# Patient Record
Sex: FEMALE | Race: WHITE | HISPANIC OR LATINO | Employment: FULL TIME | URBAN - METROPOLITAN AREA
[De-identification: names, ages, dates, MRNs, and addresses within clinical notes are randomized per-mention and may not be internally consistent; named-entity substitution may affect disease eponyms.]

---

## 2021-08-14 ENCOUNTER — LAB VISIT (OUTPATIENT)
Dept: PRIMARY CARE CLINIC | Facility: OTHER | Age: 21
End: 2021-08-14
Payer: OTHER GOVERNMENT

## 2021-08-14 DIAGNOSIS — U07.1 COVID-19: Primary | ICD-10-CM

## 2021-08-14 PROCEDURE — U0003 INFECTIOUS AGENT DETECTION BY NUCLEIC ACID (DNA OR RNA); SEVERE ACUTE RESPIRATORY SYNDROME CORONAVIRUS 2 (SARS-COV-2) (CORONAVIRUS DISEASE [COVID-19]), AMPLIFIED PROBE TECHNIQUE, MAKING USE OF HIGH THROUGHPUT TECHNOLOGIES AS DESCRIBED BY CMS-2020-01-R: HCPCS | Performed by: INTERNAL MEDICINE

## 2021-08-16 LAB
SARS-COV-2 RNA RESP QL NAA+PROBE: NOT DETECTED
SARS-COV-2- CYCLE NUMBER: -1

## 2022-05-25 ENCOUNTER — PATIENT MESSAGE (OUTPATIENT)
Dept: PSYCHIATRY | Facility: CLINIC | Age: 22
End: 2022-05-25
Payer: COMMERCIAL

## 2022-06-28 ENCOUNTER — OFFICE VISIT (OUTPATIENT)
Dept: INTERNAL MEDICINE | Facility: CLINIC | Age: 22
End: 2022-06-28
Payer: COMMERCIAL

## 2022-06-28 ENCOUNTER — HOSPITAL ENCOUNTER (OUTPATIENT)
Dept: RADIOLOGY | Facility: HOSPITAL | Age: 22
Discharge: HOME | End: 2022-06-28
Attending: NURSE PRACTITIONER
Payer: COMMERCIAL

## 2022-06-28 VITALS
HEIGHT: 59 IN | WEIGHT: 101.44 LBS | BODY MASS INDEX: 20.45 KG/M2 | TEMPERATURE: 99 F | OXYGEN SATURATION: 99 % | DIASTOLIC BLOOD PRESSURE: 64 MMHG | HEART RATE: 98 BPM | SYSTOLIC BLOOD PRESSURE: 112 MMHG

## 2022-06-28 DIAGNOSIS — J02.9 PHARYNGITIS, UNSPECIFIED ETIOLOGY: ICD-10-CM

## 2022-06-28 DIAGNOSIS — R53.83 FATIGUE, UNSPECIFIED TYPE: ICD-10-CM

## 2022-06-28 DIAGNOSIS — R05.9 COUGH: ICD-10-CM

## 2022-06-28 DIAGNOSIS — R63.0 LOSS OF APPETITE: ICD-10-CM

## 2022-06-28 DIAGNOSIS — R11.0 NAUSEA: ICD-10-CM

## 2022-06-28 DIAGNOSIS — B34.9 ACUTE VIRAL SYNDROME: ICD-10-CM

## 2022-06-28 DIAGNOSIS — B34.9 ACUTE VIRAL SYNDROME: Primary | ICD-10-CM

## 2022-06-28 LAB
GROUP A STREP, MOLECULAR: NEGATIVE
INFLUENZA A, MOLECULAR: POSITIVE
INFLUENZA B, MOLECULAR: NEGATIVE
SARS-COV-2 RNA RESP QL NAA+PROBE: NOT DETECTED
SPECIMEN SOURCE: ABNORMAL

## 2022-06-28 PROCEDURE — 99999 PR PBB SHADOW E&M-EST. PATIENT-LVL IV: ICD-10-PCS | Mod: PBBFAC,,, | Performed by: NURSE PRACTITIONER

## 2022-06-28 PROCEDURE — 3008F BODY MASS INDEX DOCD: CPT | Mod: CPTII,S$GLB,, | Performed by: NURSE PRACTITIONER

## 2022-06-28 PROCEDURE — 3074F PR MOST RECENT SYSTOLIC BLOOD PRESSURE < 130 MM HG: ICD-10-PCS | Mod: CPTII,S$GLB,, | Performed by: NURSE PRACTITIONER

## 2022-06-28 PROCEDURE — 99204 PR OFFICE/OUTPT VISIT, NEW, LEVL IV, 45-59 MIN: ICD-10-PCS | Mod: S$GLB,,, | Performed by: NURSE PRACTITIONER

## 2022-06-28 PROCEDURE — 1159F PR MEDICATION LIST DOCUMENTED IN MEDICAL RECORD: ICD-10-PCS | Mod: CPTII,S$GLB,, | Performed by: NURSE PRACTITIONER

## 2022-06-28 PROCEDURE — 1159F MED LIST DOCD IN RCRD: CPT | Mod: CPTII,S$GLB,, | Performed by: NURSE PRACTITIONER

## 2022-06-28 PROCEDURE — 3008F PR BODY MASS INDEX (BMI) DOCUMENTED: ICD-10-PCS | Mod: CPTII,S$GLB,, | Performed by: NURSE PRACTITIONER

## 2022-06-28 PROCEDURE — 87502 INFLUENZA DNA AMP PROBE: CPT | Performed by: NURSE PRACTITIONER

## 2022-06-28 PROCEDURE — 71046 XR CHEST PA AND LATERAL: ICD-10-PCS | Mod: 26,,, | Performed by: RADIOLOGY

## 2022-06-28 PROCEDURE — 3078F PR MOST RECENT DIASTOLIC BLOOD PRESSURE < 80 MM HG: ICD-10-PCS | Mod: CPTII,S$GLB,, | Performed by: NURSE PRACTITIONER

## 2022-06-28 PROCEDURE — 3074F SYST BP LT 130 MM HG: CPT | Mod: CPTII,S$GLB,, | Performed by: NURSE PRACTITIONER

## 2022-06-28 PROCEDURE — U0005 INFEC AGEN DETEC AMPLI PROBE: HCPCS | Performed by: NURSE PRACTITIONER

## 2022-06-28 PROCEDURE — 99204 OFFICE O/P NEW MOD 45 MIN: CPT | Mod: S$GLB,,, | Performed by: NURSE PRACTITIONER

## 2022-06-28 PROCEDURE — 71046 X-RAY EXAM CHEST 2 VIEWS: CPT | Mod: 26,,, | Performed by: RADIOLOGY

## 2022-06-28 PROCEDURE — U0003 INFECTIOUS AGENT DETECTION BY NUCLEIC ACID (DNA OR RNA); SEVERE ACUTE RESPIRATORY SYNDROME CORONAVIRUS 2 (SARS-COV-2) (CORONAVIRUS DISEASE [COVID-19]), AMPLIFIED PROBE TECHNIQUE, MAKING USE OF HIGH THROUGHPUT TECHNOLOGIES AS DESCRIBED BY CMS-2020-01-R: HCPCS | Performed by: NURSE PRACTITIONER

## 2022-06-28 PROCEDURE — 71046 X-RAY EXAM CHEST 2 VIEWS: CPT | Mod: TC

## 2022-06-28 PROCEDURE — 87651 STREP A DNA AMP PROBE: CPT | Performed by: NURSE PRACTITIONER

## 2022-06-28 PROCEDURE — 3078F DIAST BP <80 MM HG: CPT | Mod: CPTII,S$GLB,, | Performed by: NURSE PRACTITIONER

## 2022-06-28 PROCEDURE — 99999 PR PBB SHADOW E&M-EST. PATIENT-LVL IV: CPT | Mod: PBBFAC,,, | Performed by: NURSE PRACTITIONER

## 2022-06-28 RX ORDER — MIRTAZAPINE 30 MG/1
30 TABLET, FILM COATED ORAL NIGHTLY
Qty: 30 TABLET | Refills: 11
Start: 2022-06-28 | End: 2022-07-12 | Stop reason: SDUPTHER

## 2022-06-28 RX ORDER — METHYLPHENIDATE HYDROCHLORIDE 27 MG/1
27 TABLET ORAL EVERY MORNING
Qty: 30 TABLET | Refills: 0
Start: 2022-06-28 | End: 2022-07-12 | Stop reason: SDUPTHER

## 2022-06-28 RX ORDER — TOPIRAMATE 200 MG/1
200 TABLET ORAL DAILY
Qty: 90 TABLET | Refills: 11
Start: 2022-06-28 | End: 2022-07-12

## 2022-06-28 RX ORDER — VALACYCLOVIR HYDROCHLORIDE 1 G/1
1000 TABLET, FILM COATED ORAL DAILY
Qty: 30 TABLET | Refills: 11
Start: 2022-06-28 | End: 2022-07-12 | Stop reason: SDUPTHER

## 2022-06-28 RX ORDER — RIZATRIPTAN BENZOATE 10 MG/1
10 TABLET ORAL
Qty: 30 TABLET | Refills: 0
Start: 2022-06-28 | End: 2022-07-12 | Stop reason: SDUPTHER

## 2022-06-28 RX ORDER — BUPROPION HYDROCHLORIDE 300 MG/1
300 TABLET ORAL DAILY
Qty: 30 TABLET | Refills: 11
Start: 2022-06-28 | End: 2022-07-12 | Stop reason: SDUPTHER

## 2022-06-28 RX ORDER — BUPROPION HYDROCHLORIDE 150 MG/1
150 TABLET, EXTENDED RELEASE ORAL 2 TIMES DAILY
Qty: 60 TABLET | Refills: 11
Start: 2022-06-28 | End: 2022-07-12 | Stop reason: CLARIF

## 2022-06-28 RX ORDER — ONDANSETRON 4 MG/1
4 TABLET, ORALLY DISINTEGRATING ORAL EVERY 6 HOURS PRN
Qty: 30 TABLET | Refills: 0
Start: 2022-06-28 | End: 2022-07-12 | Stop reason: SDUPTHER

## 2022-06-28 RX ORDER — LAMOTRIGINE 200 MG/1
200 TABLET ORAL DAILY
Qty: 30 TABLET | Refills: 11
Start: 2022-06-28 | End: 2022-07-12 | Stop reason: SDUPTHER

## 2022-06-28 NOTE — PROGRESS NOTES
INTERNAL MEDICINE CLINIC - SAME DAY APPOINTMENT  Progress Note    PRESENTING HISTORY     PCP: Primary Doctor No    Chief Complaint/Reason for Visit:   No chief complaint on file.     History of Present Illness & ROS : Ms. Ashley Ayala is a 21 y.o. female.    Same day appt.   New to me and clinic practice.   Pleasant young lady.   Does not have a primary. Moved from [place].   Reports that her boyfriend has the same symptoms, both of them have been testing for COVID on the at home kits, she tested negative twice, but remains with loss of appetite, fatigue and sore throat for the past several days. The only recall of events was with a family member at the [employer] and went to skating ring. The emphasis is placed on being more 'fatigued'. States, 'they test me for strep numerous time and never positive; recent event was given a Z seferino and steroids and had the rash'.     No recent travel.   No GI symptoms.   No fever, chills or body aches. Infrequent cough.     She does report that has a history of Strep throat and also that during her recent move, was moving boxes in storage where there may have been rodents.     Review of Systems:  Eyes: denies visual changes at this time denies floaters   ENT: no nasal congestion or sore throat  Respiratory: no cough or shorness of breath  Cardiovascular: no chest pain or palpitations  Gastrointestinal: no nausea or vomiting, no abdominal pain or change in bowel habits  Genitourinary: no hematuria or dysuria; denies frequency  Hematologic/Lymphatic: no easy bruising or lymphadenopathy  Musculoskeletal: no arthralgias or myalgias  Neurological: no seizures or tremors  Endocrine: no heat or cold intolerance      PAST HISTORY:     No past medical history on file.    No past surgical history on file.    No family history on file.    Social History     Socioeconomic History    Marital status: Single       MEDICATIONS & ALLERGIES:     No current outpatient medications on file prior to  visit.     No current facility-administered medications on file prior to visit.        Review of patient's allergies indicates:  Not on File    Medications Reconciliation:   I have reconciled the patient's home medications with the patient/family. I have updated all changes.  Refer to After-Visit Medication List.    OBJECTIVE:     Vital Signs:  There were no vitals filed for this visit.  Wt Readings from Last 3 Encounters:   No data found for Wt     There is no height or weight on file to calculate BMI.   Wt Readings from Last 3 Encounters:   06/28/22 46 kg (101 lb 6.6 oz)     Temp Readings from Last 3 Encounters:   06/28/22 98.9 °F (37.2 °C)     BP Readings from Last 3 Encounters:   06/28/22 112/64     Pulse Readings from Last 3 Encounters:   06/28/22 98       Physical Exam:  General: Well developed, well nourished. No distress.  HEENT: Head is normocephalic, atraumatic  Posterior oral pharynx: very mild erythema, uvula midline, no exudate or purulence  Eyes: Clear conjunctiva.  Neck: Supple, symmetrical neck; trachea midline  Extremities: No LE edema. Pulses 2+ and symmetric.   Skin: Skin color, texture, turgor normal. No rashes.  Musculoskeletal: Normal gait.   Lymph Nodes: No cervical or supraclavicular adenopathy.  Neurologic: Normal strength and tone. No focal numbness or weakness.   Psychiatric: Not depressed.      Laboratory  No results found for: WBC, HGB, HCT, PLT, CHOL, TRIG, HDL, LDLDIRECT, ALT, AST, NA, K, CL, CREATININE, BUN, CO2, TSH, PSA, INR, GLUF, HGBA1C, MICROALBUR      ASSESSMENT & PLAN:     Same day appt.     Suspect an Acute viral syndrome   (In the setting of recent move, in and out from storage unit, check CXR to ascertain no PNA developing and attributing to her symptoms)    Fatigue, unspecified type    Pharyngitis, unspecified etiology    Loss of appetite    Cough   -     Influenza A & B by Molecular  -     Group A Strep, Molecular  -     X-Ray Chest PA And Lateral; Future; Expected date:  06/28/2022      Request to have have her current meds listed on chart, which are not. Being followed by Psychiatrist in [place] and Neurologist for chronic Migraines.   -     mirtazapine (REMERON) 30 MG tablet; Take 1 tablet (30 mg total) by mouth every evening.  Dispense: 30 tablet; Refill: 11  -     buPROPion (WELLBUTRIN XL) 300 MG 24 hr tablet; Take 1 tablet (300 mg total) by mouth once daily.  Dispense: 30 tablet; Refill: 11  -     buPROPion (WELLBUTRIN SR) 150 MG TBSR 12 hr tablet; Take 1 tablet (150 mg total) by mouth 2 (two) times daily.  Dispense: 60 tablet; Refill: 11  -     lamoTRIgine (LAMICTAL) 200 MG tablet; Take 1 tablet (200 mg total) by mouth once daily.  Dispense: 30 tablet; Refill: 11  -     valACYclovir (VALTREX) 1000 MG tablet; Take 1 tablet (1,000 mg total) by mouth once daily.  Dispense: 30 tablet; Refill: 11  -     topiramate (TOPAMAX) 200 MG Tab; Take 1 tablet (200 mg total) by mouth once daily.  Dispense: 90 tablet; Refill: 11  -     methylphenidate HCl 27 MG CR tablet; Take 1 tablet (27 mg total) by mouth every morning.  Dispense: 30 tablet; Refill: 0  -     rizatriptan (MAXALT) 10 MG tablet; Take 1 tablet (10 mg total) by mouth as needed for Migraine.  Dispense: 30 tablet; Refill: 0  -     ondansetron (ZOFRAN-ODT) 4 MG TbDL; Take 1 tablet (4 mg total) by mouth every 6 (six) hours as needed (nausea).  Dispense: 30 tablet; Refill: 0    *Encouraged to keep the appt with establishing with a new Primary Care Physician on 7/12/2022.     Future Appointments   Date Time Provider Department Center   7/12/2022  8:30 AM Vika Paul MD [facility]        Medication List          Accurate as of June 28, 2022  2:06 PM. If you have any questions, ask your nurse or doctor.            START taking these medications    * buPROPion 300 MG 24 hr tablet  Commonly known as: WELLBUTRIN XL  Take 1 tablet (300 mg total) by mouth once daily.  Started by: Chiquis Coulter, FRANTZ     *  buPROPion 150 MG TBSR 12 hr tablet  Commonly known as: WELLBUTRIN SR  Take 1 tablet (150 mg total) by mouth 2 (two) times daily.  Started by: FRANTZ Molina     lamoTRIgine 200 MG tablet  Commonly known as: LAMICTAL  Take 1 tablet (200 mg total) by mouth once daily.  Started by: FRANTZ Molina     methylphenidate HCl 27 MG CR tablet  Take 1 tablet (27 mg total) by mouth every morning.  Started by: FRANTZ Molina     mirtazapine 30 MG tablet  Commonly known as: REMERON  Take 1 tablet (30 mg total) by mouth every evening.  Started by: FRANTZ Molina     ondansetron 4 MG Tbdl  Commonly known as: ZOFRAN-ODT  Take 1 tablet (4 mg total) by mouth every 6 (six) hours as needed (nausea).  Started by: FRANTZ Molina     rizatriptan 10 MG tablet  Commonly known as: MAXALT  Take 1 tablet (10 mg total) by mouth as needed for Migraine.  Started by: FRANTZ Molina     topiramate 200 MG Tab  Commonly known as: TOPAMAX  Take 1 tablet (200 mg total) by mouth once daily.  Started by: RFANTZ Molina     valACYclovir 1000 MG tablet  Commonly known as: VALTREX  Take 1 tablet (1,000 mg total) by mouth once daily.  Started by: FRANTZ Molina         * This list has 2 medication(s) that are the same as other medications prescribed for you. Read the directions carefully, and ask your doctor or other care provider to review them with you.               Where to Get Your Medications      Information about where to get these medications is not yet available    Ask your nurse or doctor about these medications  · buPROPion 150 MG TBSR 12 hr tablet  · buPROPion 300 MG 24 hr tablet  · lamoTRIgine 200 MG tablet  · methylphenidate HCl 27 MG CR tablet  · mirtazapine 30 MG tablet  · ondansetron 4 MG Tbdl  · rizatriptan 10 MG tablet  · topiramate 200 MG Tab  · valACYclovir 1000 MG tablet         Signing Physician:  FRANTZ Molina

## 2022-07-12 ENCOUNTER — LAB VISIT (OUTPATIENT)
Dept: LAB | Facility: HOSPITAL | Age: 22
End: 2022-07-12
Attending: INTERNAL MEDICINE
Payer: COMMERCIAL

## 2022-07-12 ENCOUNTER — OFFICE VISIT (OUTPATIENT)
Dept: PRIMARY CARE CLINIC | Facility: CLINIC | Age: 22
End: 2022-07-12
Payer: COMMERCIAL

## 2022-07-12 VITALS
DIASTOLIC BLOOD PRESSURE: 56 MMHG | SYSTOLIC BLOOD PRESSURE: 94 MMHG | HEART RATE: 94 BPM | HEIGHT: 59 IN | TEMPERATURE: 99 F | WEIGHT: 102.94 LBS | BODY MASS INDEX: 20.75 KG/M2 | RESPIRATION RATE: 18 BRPM | OXYGEN SATURATION: 99 %

## 2022-07-12 DIAGNOSIS — Z11.4 SCREENING FOR HIV (HUMAN IMMUNODEFICIENCY VIRUS): ICD-10-CM

## 2022-07-12 DIAGNOSIS — F41.1 GAD (GENERALIZED ANXIETY DISORDER): ICD-10-CM

## 2022-07-12 DIAGNOSIS — F90.0 ATTENTION DEFICIT HYPERACTIVITY DISORDER (ADHD), PREDOMINANTLY INATTENTIVE TYPE: ICD-10-CM

## 2022-07-12 DIAGNOSIS — F32.9 MAJOR DEPRESSION, CHRONIC: ICD-10-CM

## 2022-07-12 DIAGNOSIS — Z00.00 ROUTINE MEDICAL EXAM: Primary | ICD-10-CM

## 2022-07-12 DIAGNOSIS — G43.009 MIGRAINE WITHOUT AURA AND WITHOUT STATUS MIGRAINOSUS, NOT INTRACTABLE: ICD-10-CM

## 2022-07-12 DIAGNOSIS — Z00.00 ROUTINE MEDICAL EXAM: ICD-10-CM

## 2022-07-12 DIAGNOSIS — Z12.4 CERVICAL CANCER SCREENING: ICD-10-CM

## 2022-07-12 DIAGNOSIS — Z11.59 NEED FOR HEPATITIS C SCREENING TEST: ICD-10-CM

## 2022-07-12 DIAGNOSIS — B00.9 HSV INFECTION: ICD-10-CM

## 2022-07-12 PROBLEM — K21.9 GASTROESOPHAGEAL REFLUX DISEASE: Status: ACTIVE | Noted: 2022-03-13

## 2022-07-12 PROBLEM — F41.8 MIXED ANXIETY AND DEPRESSIVE DISORDER: Status: ACTIVE | Noted: 2022-03-13

## 2022-07-12 PROBLEM — Z72.0 TOBACCO USER: Status: ACTIVE | Noted: 2022-03-13

## 2022-07-12 PROBLEM — K58.9 IRRITABLE BOWEL SYNDROME: Status: ACTIVE | Noted: 2022-03-13

## 2022-07-12 PROBLEM — G43.109 MIGRAINE WITH AURA: Status: ACTIVE | Noted: 2022-03-13

## 2022-07-12 LAB
ALBUMIN SERPL BCP-MCNC: 4.1 G/DL (ref 3.5–5.2)
ALP SERPL-CCNC: 56 U/L (ref 55–135)
ALT SERPL W/O P-5'-P-CCNC: 9 U/L (ref 10–44)
ANION GAP SERPL CALC-SCNC: 8 MMOL/L (ref 8–16)
AST SERPL-CCNC: 18 U/L (ref 10–40)
BILIRUB SERPL-MCNC: 0.2 MG/DL (ref 0.1–1)
BUN SERPL-MCNC: 9 MG/DL (ref 6–20)
CALCIUM SERPL-MCNC: 9.3 MG/DL (ref 8.7–10.5)
CHLORIDE SERPL-SCNC: 112 MMOL/L (ref 95–110)
CHOLEST SERPL-MCNC: 137 MG/DL (ref 120–199)
CHOLEST/HDLC SERPL: 2.3 {RATIO} (ref 2–5)
CO2 SERPL-SCNC: 17 MMOL/L (ref 23–29)
CREAT SERPL-MCNC: 0.8 MG/DL (ref 0.5–1.4)
ERYTHROCYTE [DISTWIDTH] IN BLOOD BY AUTOMATED COUNT: 13.1 % (ref 11.5–14.5)
EST. GFR  (AFRICAN AMERICAN): >60 ML/MIN/1.73 M^2
EST. GFR  (NON AFRICAN AMERICAN): >60 ML/MIN/1.73 M^2
GLUCOSE SERPL-MCNC: 103 MG/DL (ref 70–110)
HCT VFR BLD AUTO: 36.9 % (ref 37–48.5)
HDLC SERPL-MCNC: 59 MG/DL (ref 40–75)
HDLC SERPL: 43.1 % (ref 20–50)
HGB BLD-MCNC: 12.4 G/DL (ref 12–16)
LDLC SERPL CALC-MCNC: 71.8 MG/DL (ref 63–159)
MCH RBC QN AUTO: 30.6 PG (ref 27–31)
MCHC RBC AUTO-ENTMCNC: 33.6 G/DL (ref 32–36)
MCV RBC AUTO: 91 FL (ref 82–98)
NONHDLC SERPL-MCNC: 78 MG/DL
PLATELET # BLD AUTO: 322 K/UL (ref 150–450)
PMV BLD AUTO: 10.2 FL (ref 9.2–12.9)
POTASSIUM SERPL-SCNC: 4 MMOL/L (ref 3.5–5.1)
PROT SERPL-MCNC: 6.4 G/DL (ref 6–8.4)
RBC # BLD AUTO: 4.05 M/UL (ref 4–5.4)
SODIUM SERPL-SCNC: 137 MMOL/L (ref 136–145)
TRIGL SERPL-MCNC: 31 MG/DL (ref 30–150)
TSH SERPL DL<=0.005 MIU/L-ACNC: 0.64 UIU/ML (ref 0.4–4)
WBC # BLD AUTO: 5.57 K/UL (ref 3.9–12.7)

## 2022-07-12 PROCEDURE — 3078F DIAST BP <80 MM HG: CPT | Mod: CPTII,S$GLB,, | Performed by: INTERNAL MEDICINE

## 2022-07-12 PROCEDURE — 80061 LIPID PANEL: CPT | Performed by: INTERNAL MEDICINE

## 2022-07-12 PROCEDURE — 85027 COMPLETE CBC AUTOMATED: CPT | Performed by: INTERNAL MEDICINE

## 2022-07-12 PROCEDURE — 1160F RVW MEDS BY RX/DR IN RCRD: CPT | Mod: CPTII,S$GLB,, | Performed by: INTERNAL MEDICINE

## 2022-07-12 PROCEDURE — 1159F MED LIST DOCD IN RCRD: CPT | Mod: CPTII,S$GLB,, | Performed by: INTERNAL MEDICINE

## 2022-07-12 PROCEDURE — 3078F PR MOST RECENT DIASTOLIC BLOOD PRESSURE < 80 MM HG: ICD-10-PCS | Mod: CPTII,S$GLB,, | Performed by: INTERNAL MEDICINE

## 2022-07-12 PROCEDURE — 1159F PR MEDICATION LIST DOCUMENTED IN MEDICAL RECORD: ICD-10-PCS | Mod: CPTII,S$GLB,, | Performed by: INTERNAL MEDICINE

## 2022-07-12 PROCEDURE — 99395 PREV VISIT EST AGE 18-39: CPT | Mod: S$GLB,,, | Performed by: INTERNAL MEDICINE

## 2022-07-12 PROCEDURE — 3008F PR BODY MASS INDEX (BMI) DOCUMENTED: ICD-10-PCS | Mod: CPTII,S$GLB,, | Performed by: INTERNAL MEDICINE

## 2022-07-12 PROCEDURE — 99999 PR PBB SHADOW E&M-EST. PATIENT-LVL V: CPT | Mod: PBBFAC,,, | Performed by: INTERNAL MEDICINE

## 2022-07-12 PROCEDURE — 3074F PR MOST RECENT SYSTOLIC BLOOD PRESSURE < 130 MM HG: ICD-10-PCS | Mod: CPTII,S$GLB,, | Performed by: INTERNAL MEDICINE

## 2022-07-12 PROCEDURE — 99999 PR PBB SHADOW E&M-EST. PATIENT-LVL V: ICD-10-PCS | Mod: PBBFAC,,, | Performed by: INTERNAL MEDICINE

## 2022-07-12 PROCEDURE — 84443 ASSAY THYROID STIM HORMONE: CPT | Performed by: INTERNAL MEDICINE

## 2022-07-12 PROCEDURE — 3008F BODY MASS INDEX DOCD: CPT | Mod: CPTII,S$GLB,, | Performed by: INTERNAL MEDICINE

## 2022-07-12 PROCEDURE — 36415 COLL VENOUS BLD VENIPUNCTURE: CPT | Mod: PN | Performed by: INTERNAL MEDICINE

## 2022-07-12 PROCEDURE — 1160F PR REVIEW ALL MEDS BY PRESCRIBER/CLIN PHARMACIST DOCUMENTED: ICD-10-PCS | Mod: CPTII,S$GLB,, | Performed by: INTERNAL MEDICINE

## 2022-07-12 PROCEDURE — 86803 HEPATITIS C AB TEST: CPT | Performed by: INTERNAL MEDICINE

## 2022-07-12 PROCEDURE — 3074F SYST BP LT 130 MM HG: CPT | Mod: CPTII,S$GLB,, | Performed by: INTERNAL MEDICINE

## 2022-07-12 PROCEDURE — 87389 HIV-1 AG W/HIV-1&-2 AB AG IA: CPT | Performed by: INTERNAL MEDICINE

## 2022-07-12 PROCEDURE — 80053 COMPREHEN METABOLIC PANEL: CPT | Performed by: INTERNAL MEDICINE

## 2022-07-12 PROCEDURE — 99395 PR PREVENTIVE VISIT,EST,18-39: ICD-10-PCS | Mod: S$GLB,,, | Performed by: INTERNAL MEDICINE

## 2022-07-12 RX ORDER — BUPROPION HYDROCHLORIDE 300 MG/1
300 TABLET ORAL DAILY
Qty: 30 TABLET | Refills: 3 | Status: SHIPPED | OUTPATIENT
Start: 2022-07-12 | End: 2022-10-12 | Stop reason: SDUPTHER

## 2022-07-12 RX ORDER — METHYLPHENIDATE HYDROCHLORIDE 27 MG/1
27 TABLET ORAL EVERY MORNING
Qty: 30 TABLET | Refills: 0 | Status: SHIPPED | OUTPATIENT
Start: 2022-07-12 | End: 2022-08-27 | Stop reason: SDUPTHER

## 2022-07-12 RX ORDER — ONDANSETRON 4 MG/1
4 TABLET, ORALLY DISINTEGRATING ORAL EVERY 6 HOURS PRN
Qty: 30 TABLET | Refills: 1 | Status: SHIPPED | OUTPATIENT
Start: 2022-07-12 | End: 2022-11-11 | Stop reason: SDUPTHER

## 2022-07-12 RX ORDER — LAMOTRIGINE 200 MG/1
200 TABLET ORAL DAILY
Qty: 30 TABLET | Refills: 3 | Status: SHIPPED | OUTPATIENT
Start: 2022-07-12 | End: 2022-10-12 | Stop reason: SDUPTHER

## 2022-07-12 RX ORDER — MIRTAZAPINE 30 MG/1
30 TABLET, FILM COATED ORAL NIGHTLY
Qty: 30 TABLET | Refills: 3 | Status: SHIPPED | OUTPATIENT
Start: 2022-07-12 | End: 2022-10-12 | Stop reason: SDUPTHER

## 2022-07-12 RX ORDER — TOPIRAMATE 100 MG/1
200 TABLET, FILM COATED ORAL DAILY
Qty: 60 TABLET | Refills: 3 | Status: SHIPPED | OUTPATIENT
Start: 2022-07-12 | End: 2022-11-11 | Stop reason: SDUPTHER

## 2022-07-12 RX ORDER — RIZATRIPTAN BENZOATE 10 MG/1
10 TABLET ORAL
Qty: 18 TABLET | Refills: 3 | Status: SHIPPED | OUTPATIENT
Start: 2022-07-12 | End: 2022-11-11 | Stop reason: SDUPTHER

## 2022-07-12 RX ORDER — BUPROPION HYDROCHLORIDE 150 MG/1
150 TABLET ORAL DAILY
Qty: 30 TABLET | Refills: 3 | Status: SHIPPED | OUTPATIENT
Start: 2022-07-12 | End: 2022-10-12 | Stop reason: SDUPTHER

## 2022-07-12 RX ORDER — LEVONORGESTREL 13.5 MG/1
1 INTRAUTERINE DEVICE INTRAUTERINE ONCE
COMMUNITY

## 2022-07-12 RX ORDER — VALACYCLOVIR HYDROCHLORIDE 1 G/1
1000 TABLET, FILM COATED ORAL DAILY
Qty: 30 TABLET | Refills: 3 | Status: SHIPPED | OUTPATIENT
Start: 2022-07-12 | End: 2022-11-11 | Stop reason: SDUPTHER

## 2022-07-12 NOTE — PROGRESS NOTES
Subjective:       Patient ID: Ashley Ayala is a 21 y.o. female.    Chief Complaint: Annual Exam    Last seen by PCP in [place] 3/22. One prior visit here with NP two weeks ago for acute viral illness - positive for Influenza. Presents to establish care, having permanently moved to this area. Attempted to establish with Psychiatry last month but her visit was cancelled by provider and not rescheduled. History entirely from patient, pharmacy records reviewed for prescription verification. No entries on [facility] website here yet.     PMH: all of the following were diagnosed in early adolescence:  Major Depression, denies bipolar disorder.   Generalized Anxiety Disorder.   Eating Disorder, not quite anorexia, not bulimia.  ADD, predominantly inattentive.   Migraine Headaches. Neuro eval age 14.   IBS-C. H/O Anal fissures. No Colonoscopy.   GERD, normal EGD.   Oral and Genital HSV.   Vaccines reviewed, up to date except Tetanus - she declines today.     PSH: None.     Social: rare social tobacco use. Alcohol in 1 beer per week. Single, boyfriend. Quit law school 2021. Moved here from [place] this year. .     FMH: Mental Illness in numerous. HTN, Arthritis in grandparent. Unk cancer in grandparent. Thyroid dis in mother. Drug abuse in father.     Allergies: Tea Tree, NKDA.    Medications: list reviewed and reconciled, total 450mg Wellbutrin XL daily. Takes Valtrex daily for suppression. Prefers Topiramate in 100mg tabs, but dose is 200.        Review of Systems   Constitutional: Negative for activity change, appetite change, fatigue, fever and unexpected weight change.   HENT: Negative for nasal congestion, ear pain, hearing loss, rhinorrhea, sneezing, sore throat, trouble swallowing and voice change.    Eyes: Negative for photophobia, pain and visual disturbance.   Respiratory: Negative for cough, chest tightness, shortness of breath and wheezing.    Cardiovascular: Negative for chest pain, palpitations  "and leg swelling.   Gastrointestinal: Negative for abdominal pain, blood in stool, diarrhea, nausea and vomiting.        Intermittent cramping and constipation.    Genitourinary: Negative for dysuria, frequency, menstrual problem and pelvic pain.   Musculoskeletal: Negative for arthralgias, gait problem, joint swelling and myalgias.   Integumentary:  Negative for color change and rash.   Neurological: Positive for headaches. Negative for dizziness, syncope, facial asymmetry, speech difficulty, weakness and numbness.   Hematological: Negative for adenopathy. Does not bruise/bleed easily.   Psychiatric/Behavioral: Negative for agitation, dysphoric mood and sleep disturbance. The patient is not nervous/anxious.         Stable on current regimen.          Objective:    BP 94/56, Pulse 94, Temp 98.6, O2 Sat 99%, Ht 4' 11", Wt 103 lbs, BMI=20.8  Physical Exam  Vitals reviewed.   Constitutional:       General: She is not in acute distress.     Appearance: She is well-developed. She is not ill-appearing or diaphoretic.   HENT:      Head: Normocephalic and atraumatic.      Right Ear: Tympanic membrane and ear canal normal.      Left Ear: Tympanic membrane and ear canal normal.      Nose: Nose normal. No congestion.      Mouth/Throat:      Mouth: Mucous membranes are moist.      Pharynx: Oropharynx is clear.   Eyes:      General: No scleral icterus.     Extraocular Movements: Extraocular movements intact.      Conjunctiva/sclera: Conjunctivae normal.      Right eye: Right conjunctiva is not injected.      Left eye: Left conjunctiva is not injected.      Pupils: Pupils are equal, round, and reactive to light.   Neck:      Thyroid: No thyromegaly.      Vascular: No carotid bruit or JVD.   Cardiovascular:      Rate and Rhythm: Normal rate and regular rhythm.      Pulses: Normal pulses.      Heart sounds: Normal heart sounds. No murmur heard.    No friction rub. No gallop.   Pulmonary:      Effort: Pulmonary effort is normal. No " respiratory distress.      Breath sounds: Normal breath sounds. No wheezing, rhonchi or rales.   Abdominal:      General: Bowel sounds are normal. There is no distension.      Palpations: Abdomen is soft. There is no mass.      Tenderness: There is no abdominal tenderness. There is no guarding or rebound.   Musculoskeletal:         General: No tenderness or deformity. Normal range of motion.      Cervical back: Normal range of motion and neck supple.      Right lower leg: No edema.      Left lower leg: No edema.   Lymphadenopathy:      Cervical: No cervical adenopathy.   Skin:     General: Skin is warm and dry.      Coloration: Skin is not pale.      Findings: No erythema or rash.      Nails: There is no clubbing.   Neurological:      General: No focal deficit present.      Mental Status: She is alert and oriented to person, place, and time.      Cranial Nerves: No cranial nerve deficit.      Motor: No abnormal muscle tone.      Coordination: Coordination normal.      Gait: Gait normal.      Deep Tendon Reflexes: Reflexes are normal and symmetric.   Psychiatric:         Mood and Affect: Mood normal.         Speech: Speech normal.         Behavior: Behavior normal.         Thought Content: Thought content normal.         Assessment:       Problem List Items Addressed This Visit     HSV infection    Relevant Medications    valACYclovir (VALTREX) 1000 MG tablet    Major depression, chronic    Relevant Medications    lamoTRIgine (LAMICTAL) 200 MG tablet    mirtazapine (REMERON) 30 MG tablet    buPROPion (WELLBUTRIN XL) 300 MG 24 hr tablet    buPROPion (WELLBUTRIN XL) 150 MG TB24 tablet    Other Relevant Orders    Ambulatory referral/consult to Psychiatry    TSH    DARIO (generalized anxiety disorder)    Relevant Orders    Ambulatory referral/consult to Psychiatry    TSH    Attention deficit hyperactivity disorder (ADHD), predominantly inattentive type    Relevant Medications    methylphenidate HCl 27 MG CR tablet    Other  Relevant Orders    Ambulatory referral/consult to Psychiatry      Other Visit Diagnoses     Routine medical exam    -  Primary    Relevant Orders    CBC Without Differential    Comprehensive Metabolic Panel    Lipid Panel    Migraine without aura and without status migrainosus, not intractable        Relevant Medications    rizatriptan (MAXALT) 10 MG tablet    topiramate (TOPAMAX) 100 MG tablet    ondansetron (ZOFRAN-ODT) 4 MG TbDL    Cervical cancer screening        Relevant Orders    Ambulatory referral/consult to Gynecology    Need for hepatitis C screening test        Relevant Orders    Hepatitis C Antibody    Screening for HIV (human immunodeficiency virus)        Relevant Orders    HIV 1/2 Ag/Ab (4th Gen)          Plan:       Routine medical exam  -     CBC Without Differential; Future; Expected date: 07/12/2022  -     Comprehensive Metabolic Panel; Future; Expected date: 07/12/2022  -     Lipid Panel; Future; Expected date: 07/12/2022    Major depression, chronic  -     Ambulatory referral/consult to Psychiatry; Future; Expected date: 07/19/2022  -     TSH; Future; Expected date: 07/12/2022  -     lamoTRIgine (LAMICTAL) 200 MG tablet; Take 1 tablet (200 mg total) by mouth once daily.  Dispense: 30 tablet; Refill: 3  -     mirtazapine (REMERON) 30 MG tablet; Take 1 tablet (30 mg total) by mouth every evening.  Dispense: 30 tablet; Refill: 3  -     buPROPion (WELLBUTRIN XL) 300 MG 24 hr tablet; Take 1 tablet (300 mg total) by mouth once daily.  Dispense: 30 tablet; Refill: 3  -     buPROPion (WELLBUTRIN XL) 150 MG TB24 tablet; Take 1 tablet (150 mg total) by mouth once daily.  Dispense: 30 tablet; Refill: 3    DARIO (generalized anxiety disorder)  -     Ambulatory referral/consult to Psychiatry; Future; Expected date: 07/19/2022  -     TSH; Future; Expected date: 07/12/2022    Attention deficit hyperactivity disorder (ADHD), predominantly inattentive type  -     Ambulatory referral/consult to Psychiatry; Future;  Expected date: 07/19/2022  -     methylphenidate HCl 27 MG CR tablet; Take 1 tablet (27 mg total) by mouth every morning.  Dispense: 30 tablet; Refill: 0    Migraine without aura and without status migrainosus, not intractable  -     rizatriptan (MAXALT) 10 MG tablet; Take 1 tablet (10 mg total) by mouth as needed for Migraine.  Dispense: 18 tablet; Refill: 3  -     topiramate (TOPAMAX) 100 MG tablet; Take 2 tablets (200 mg total) by mouth once daily.  Dispense: 60 tablet; Refill: 3  -     ondansetron (ZOFRAN-ODT) 4 MG TbDL; Take 1 tablet (4 mg total) by mouth every 6 (six) hours as needed (nausea).  Dispense: 30 tablet; Refill: 1    HSV infection  -     valACYclovir (VALTREX) 1000 MG tablet; Take 1 tablet (1,000 mg total) by mouth once daily.  Dispense: 30 tablet; Refill: 3    Cervical cancer screening  -     Ambulatory referral/consult to Gynecology; Future; Expected date: 07/19/2022    Need for hepatitis C screening test  -     Hepatitis C Antibody; Future; Expected date: 07/12/2022    Screening for HIV (human immunodeficiency virus)  -     HIV 1/2 Ag/Ab (4th Gen); Future; Expected date: 07/12/2022

## 2022-07-13 LAB
HCV AB SERPL QL IA: NEGATIVE
HIV 1+2 AB+HIV1 P24 AG SERPL QL IA: NEGATIVE

## 2022-07-17 ENCOUNTER — PATIENT MESSAGE (OUTPATIENT)
Dept: PRIMARY CARE CLINIC | Facility: CLINIC | Age: 22
End: 2022-07-17
Payer: COMMERCIAL

## 2022-08-27 DIAGNOSIS — F90.0 ATTENTION DEFICIT HYPERACTIVITY DISORDER (ADHD), PREDOMINANTLY INATTENTIVE TYPE: ICD-10-CM

## 2022-08-27 NOTE — TELEPHONE ENCOUNTER
No new care gaps identified.  [facility] Embedded Care Gaps. Reference number: [id number]. 8/27/2022   11:11:43 AM JAYCEE

## 2022-08-29 RX ORDER — METHYLPHENIDATE HYDROCHLORIDE 27 MG/1
27 TABLET ORAL EVERY MORNING
Qty: 30 TABLET | Refills: 0 | Status: SHIPPED | OUTPATIENT
Start: 2022-09-28 | End: 2022-10-12 | Stop reason: SDUPTHER

## 2022-08-29 RX ORDER — METHYLPHENIDATE HYDROCHLORIDE 27 MG/1
27 TABLET ORAL EVERY MORNING
Qty: 30 TABLET | Refills: 0 | Status: SHIPPED | OUTPATIENT
Start: 2022-08-29 | End: 2022-09-28

## 2022-10-12 ENCOUNTER — OFFICE VISIT (OUTPATIENT)
Dept: PSYCHIATRY | Facility: CLINIC | Age: 22
End: 2022-10-12
Payer: COMMERCIAL

## 2022-10-12 VITALS
WEIGHT: 99.13 LBS | SYSTOLIC BLOOD PRESSURE: 106 MMHG | DIASTOLIC BLOOD PRESSURE: 60 MMHG | BODY MASS INDEX: 20.02 KG/M2 | HEART RATE: 112 BPM

## 2022-10-12 DIAGNOSIS — F41.1 GAD (GENERALIZED ANXIETY DISORDER): ICD-10-CM

## 2022-10-12 DIAGNOSIS — F90.0 ATTENTION DEFICIT HYPERACTIVITY DISORDER (ADHD), PREDOMINANTLY INATTENTIVE TYPE: ICD-10-CM

## 2022-10-12 DIAGNOSIS — F32.9 MAJOR DEPRESSION, CHRONIC: ICD-10-CM

## 2022-10-12 PROCEDURE — 3008F BODY MASS INDEX DOCD: CPT | Mod: CPTII,S$GLB,, | Performed by: STUDENT IN AN ORGANIZED HEALTH CARE EDUCATION/TRAINING PROGRAM

## 2022-10-12 PROCEDURE — 3078F DIAST BP <80 MM HG: CPT | Mod: CPTII,S$GLB,, | Performed by: STUDENT IN AN ORGANIZED HEALTH CARE EDUCATION/TRAINING PROGRAM

## 2022-10-12 PROCEDURE — 3008F PR BODY MASS INDEX (BMI) DOCUMENTED: ICD-10-PCS | Mod: CPTII,S$GLB,, | Performed by: STUDENT IN AN ORGANIZED HEALTH CARE EDUCATION/TRAINING PROGRAM

## 2022-10-12 PROCEDURE — 90792 PSYCH DIAG EVAL W/MED SRVCS: CPT | Mod: S$GLB,,, | Performed by: STUDENT IN AN ORGANIZED HEALTH CARE EDUCATION/TRAINING PROGRAM

## 2022-10-12 PROCEDURE — 99999 PR PBB SHADOW E&M-EST. PATIENT-LVL II: CPT | Mod: PBBFAC,,, | Performed by: STUDENT IN AN ORGANIZED HEALTH CARE EDUCATION/TRAINING PROGRAM

## 2022-10-12 PROCEDURE — 3074F PR MOST RECENT SYSTOLIC BLOOD PRESSURE < 130 MM HG: ICD-10-PCS | Mod: CPTII,S$GLB,, | Performed by: STUDENT IN AN ORGANIZED HEALTH CARE EDUCATION/TRAINING PROGRAM

## 2022-10-12 PROCEDURE — 90792 PR PSYCHIATRIC DIAGNOSTIC EVALUATION W/MEDICAL SERVICES: ICD-10-PCS | Mod: S$GLB,,, | Performed by: STUDENT IN AN ORGANIZED HEALTH CARE EDUCATION/TRAINING PROGRAM

## 2022-10-12 PROCEDURE — 3078F PR MOST RECENT DIASTOLIC BLOOD PRESSURE < 80 MM HG: ICD-10-PCS | Mod: CPTII,S$GLB,, | Performed by: STUDENT IN AN ORGANIZED HEALTH CARE EDUCATION/TRAINING PROGRAM

## 2022-10-12 PROCEDURE — 3074F SYST BP LT 130 MM HG: CPT | Mod: CPTII,S$GLB,, | Performed by: STUDENT IN AN ORGANIZED HEALTH CARE EDUCATION/TRAINING PROGRAM

## 2022-10-12 PROCEDURE — 99999 PR PBB SHADOW E&M-EST. PATIENT-LVL II: ICD-10-PCS | Mod: PBBFAC,,, | Performed by: STUDENT IN AN ORGANIZED HEALTH CARE EDUCATION/TRAINING PROGRAM

## 2022-10-12 RX ORDER — BUPROPION HYDROCHLORIDE 150 MG/1
150 TABLET ORAL DAILY
Qty: 30 TABLET | Refills: 3 | Status: SHIPPED | OUTPATIENT
Start: 2022-10-12 | End: 2023-01-04 | Stop reason: SDUPTHER

## 2022-10-12 RX ORDER — METHYLPHENIDATE HYDROCHLORIDE 27 MG/1
27 TABLET ORAL EVERY MORNING
Qty: 30 TABLET | Refills: 0 | Status: SHIPPED | OUTPATIENT
Start: 2022-11-09 | End: 2022-11-16 | Stop reason: SDUPTHER

## 2022-10-12 RX ORDER — METHYLPHENIDATE HYDROCHLORIDE 27 MG/1
27 TABLET ORAL EVERY MORNING
Qty: 30 TABLET | Refills: 0 | Status: SHIPPED | OUTPATIENT
Start: 2022-10-12 | End: 2022-12-07 | Stop reason: SDUPTHER

## 2022-10-12 RX ORDER — LAMOTRIGINE 200 MG/1
200 TABLET ORAL DAILY
Qty: 30 TABLET | Refills: 3 | Status: SHIPPED | OUTPATIENT
Start: 2022-10-12 | End: 2023-01-04 | Stop reason: SDUPTHER

## 2022-10-12 RX ORDER — BUPROPION HYDROCHLORIDE 300 MG/1
300 TABLET ORAL DAILY
Qty: 30 TABLET | Refills: 3 | Status: SHIPPED | OUTPATIENT
Start: 2022-10-12 | End: 2023-01-04 | Stop reason: SDUPTHER

## 2022-10-12 RX ORDER — MIRTAZAPINE 30 MG/1
30 TABLET, FILM COATED ORAL NIGHTLY
Qty: 30 TABLET | Refills: 3 | Status: SHIPPED | OUTPATIENT
Start: 2022-10-12 | End: 2023-01-04 | Stop reason: SDUPTHER
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
## 2022-11-11 ENCOUNTER — OFFICE VISIT (OUTPATIENT)
Dept: PRIMARY CARE CLINIC | Facility: CLINIC | Age: 22
End: 2022-11-11
Payer: COMMERCIAL

## 2022-11-11 DIAGNOSIS — F32.9 MAJOR DEPRESSION, CHRONIC: ICD-10-CM

## 2022-11-11 DIAGNOSIS — B00.9 HSV INFECTION: ICD-10-CM

## 2022-11-11 DIAGNOSIS — F41.1 GAD (GENERALIZED ANXIETY DISORDER): ICD-10-CM

## 2022-11-11 DIAGNOSIS — F90.0 ATTENTION DEFICIT HYPERACTIVITY DISORDER (ADHD), PREDOMINANTLY INATTENTIVE TYPE: ICD-10-CM

## 2022-11-11 DIAGNOSIS — G43.009 MIGRAINE WITHOUT AURA AND WITHOUT STATUS MIGRAINOSUS, NOT INTRACTABLE: Primary | ICD-10-CM

## 2022-11-11 PROCEDURE — 1160F RVW MEDS BY RX/DR IN RCRD: CPT | Mod: CPTII,95,, | Performed by: INTERNAL MEDICINE

## 2022-11-11 PROCEDURE — 99213 OFFICE O/P EST LOW 20 MIN: CPT | Mod: 95,,, | Performed by: INTERNAL MEDICINE

## 2022-11-11 PROCEDURE — 1159F MED LIST DOCD IN RCRD: CPT | Mod: CPTII,95,, | Performed by: INTERNAL MEDICINE

## 2022-11-11 PROCEDURE — 99213 PR OFFICE/OUTPT VISIT, EST, LEVL III, 20-29 MIN: ICD-10-PCS | Mod: 95,,, | Performed by: INTERNAL MEDICINE

## 2022-11-11 PROCEDURE — 1160F PR REVIEW ALL MEDS BY PRESCRIBER/CLIN PHARMACIST DOCUMENTED: ICD-10-PCS | Mod: CPTII,95,, | Performed by: INTERNAL MEDICINE

## 2022-11-11 PROCEDURE — 1159F PR MEDICATION LIST DOCUMENTED IN MEDICAL RECORD: ICD-10-PCS | Mod: CPTII,95,, | Performed by: INTERNAL MEDICINE

## 2022-11-11 RX ORDER — ONDANSETRON 4 MG/1
4 TABLET, ORALLY DISINTEGRATING ORAL EVERY 6 HOURS PRN
Qty: 20 TABLET | Refills: 2 | Status: SHIPPED | OUTPATIENT
Start: 2022-11-11 | End: 2022-11-18

## 2022-11-11 RX ORDER — TOPIRAMATE 100 MG/1
200 TABLET, FILM COATED ORAL DAILY
Qty: 60 TABLET | Refills: 2 | Status: SHIPPED | OUTPATIENT
Start: 2022-11-11

## 2022-11-11 RX ORDER — VALACYCLOVIR HYDROCHLORIDE 1 G/1
1000 TABLET, FILM COATED ORAL DAILY
Qty: 30 TABLET | Refills: 5 | Status: SHIPPED | OUTPATIENT
Start: 2022-11-11

## 2022-11-11 RX ORDER — RIZATRIPTAN BENZOATE 10 MG/1
10 TABLET ORAL
Qty: 18 TABLET | Refills: 2 | Status: SHIPPED | OUTPATIENT
Start: 2022-11-11 | End: 2022-11-11 | Stop reason: SDUPTHER

## 2022-11-11 RX ORDER — RIZATRIPTAN BENZOATE 10 MG/1
10 TABLET ORAL
Qty: 18 TABLET | Refills: 2 | Status: SHIPPED | OUTPATIENT
Start: 2022-11-11 | End: 2023-02-27 | Stop reason: CLARIF

## 2022-11-14 NOTE — PROGRESS NOTES
Subjective:       Patient ID: Ashley Ayala is a 22 y.o. female.    Chief Complaint: Follow-up    The patient location is: [place]  The chief complaint leading to consultation is: Follow Up    Visit type: audiovisual    Face to Face time with patient: 14 minutes  22 minutes of total time spent on the encounter, which includes face to face time and non-face to face time preparing to see the patient (eg, review of tests), Obtaining and/or reviewing separately obtained history, Documenting clinical information in the electronic or other health record, Independently interpreting results (not separately reported) and communicating results to the patient/family/caregiver, or Care coordination (not separately reported).     Each patient to whom he or she provides medical services by telemedicine is:  (1) informed of the relationship between the physician and patient and the respective role of any other health care provider with respect to management of the patient; and (2) notified that he or she may decline to receive medical services by telemedicine and may withdraw from such care at any time.    Notes:   Seen for initial visit to establish care four months ago. Has established with Psychiatry since then as recommended, where her mental health medications are now managed. Here for follow up migraine headaches, in need of med refills. Maxalt 10mg was ordered for 18 tablets per month - she is out. Compliant with daily Topiramate, but complains of increasing frequency of headaches to near daily. Describes a throbbing head pain, global distribution, wakes with it in the morning, worse at mid-day. No visual aura, but does have severe photophobia, and sometimes tinnitus. Severity up to 7/10, with nausea. She was not able to get the full order of Zofran, just 6 tabs per month.     PMH: all of the following were diagnosed in early adolescence:  Major Depression, denies bipolar disorder.   Generalized Anxiety Disorder.    Eating Disorder, not quite anorexia, not bulimia.  ADD, predominantly inattentive.   Migraine Headaches. Neuro eval age 14.   IBS-C. H/O Anal fissures. No Colonoscopy.   GERD, normal EGD.   Oral and Genital HSV.   Vaccines reviewed, up to date except Tetanus - she declines today.     PSH: None.     Social: rare social tobacco use. Alcohol in 1 beer per week. Single, boyfriend. Quit [employer] school 2021. Moved here from [place] this year. . Caffeine intake - one cup of coffee daily.    FMH: Mental Illness in numerous. HTN, Arthritis in grandparent. Unk cancer in grandparent. Thyroid dis in mother. Drug abuse in father.     Allergies: Tea Tree, NKDA.    Medications: list reviewed and reconciled, total 450mg Wellbutrin XL daily. Takes Valtrex daily for suppression. Prefers Topiramate in 100mg tabs, but dose is 200.        Review of Systems   Constitutional:  Negative for activity change and unexpected weight change.   HENT:  Negative for hearing loss, rhinorrhea and trouble swallowing.    Eyes:  Positive for photophobia. Negative for discharge and visual disturbance.   Respiratory:  Negative for chest tightness and wheezing.    Cardiovascular:  Positive for chest pain. Negative for palpitations.        She did not mention chest pain during the encounter.   Gastrointestinal:  Positive for nausea. Negative for blood in stool, constipation, diarrhea and vomiting.   Endocrine: Negative for polydipsia and polyuria.   Genitourinary:  Negative for difficulty urinating, dysuria, hematuria and menstrual problem.   Musculoskeletal:  Negative for arthralgias, joint swelling and neck pain.   Neurological:  Positive for headaches. Negative for weakness.   Psychiatric/Behavioral:  Negative for confusion and dysphoric mood.        Objective:      Physical Exam  Constitutional:       General: She is not in acute distress.     Appearance: She is not ill-appearing.   Neurological:      General: No focal deficit present.       Mental Status: She is alert.   Psychiatric:         Mood and Affect: Mood normal.         Behavior: Behavior normal.         Thought Content: Thought content normal.       No visits with results within 3 Week(s) from this visit.   Latest known visit with results is:   Lab Visit on 07/12/2022   Component Date Value    WBC 07/12/2022 5.57     RBC 07/12/2022 4.05     Hemoglobin 07/12/2022 12.4     Hematocrit 07/12/2022 36.9 (L)     MCV 07/12/2022 91     MCH 07/12/2022 30.6     MCHC 07/12/2022 33.6     RDW 07/12/2022 13.1     Platelets 07/12/2022 322     MPV 07/12/2022 10.2     Sodium 07/12/2022 137     Potassium 07/12/2022 4.0     Chloride 07/12/2022 112 (H)     CO2 07/12/2022 17 (L)     Glucose 07/12/2022 103     BUN 07/12/2022 9     Creatinine 07/12/2022 0.8     Calcium 07/12/2022 9.3     Total Protein 07/12/2022 6.4     Albumin 07/12/2022 4.1     Total Bilirubin 07/12/2022 0.2     Alkaline Phosphatase 07/12/2022 56     AST 07/12/2022 18     ALT 07/12/2022 9 (L)     Anion Gap 07/12/2022 8     eGFR if African American 07/12/2022 >60.0     eGFR if non African Amer* 07/12/2022 >60.0     Cholesterol 07/12/2022 137     Triglycerides 07/12/2022 31     HDL 07/12/2022 59     LDL Cholesterol 07/12/2022 71.8     HDL/Cholesterol Ratio 07/12/2022 43.1     Total Cholesterol/HDL Ra* 07/12/2022 2.3     Non-HDL Cholesterol 07/12/2022 78     TSH 07/12/2022 0.636     Hepatitis C Ab 07/12/2022 Negative     HIV 1/2 Ag/Ab 07/12/2022 Negative      Assessment:       Problem List Items Addressed This Visit       HSV infection    Relevant Medications    valACYclovir (VALTREX) 1000 MG tablet    Major depression, chronic    DARIO (generalized anxiety disorder)    Attention deficit hyperactivity disorder (ADHD), predominantly inattentive type     Other Visit Diagnoses       Migraine without aura and without status migrainosus, not intractable    -  Primary    Relevant Medications    topiramate (TOPAMAX) 100 MG tablet    ondansetron (ZOFRAN-ODT)  4 MG TbDL    rizatriptan (MAXALT) 10 MG tablet    Other Relevant Orders    Ambulatory referral/consult to Neurology              Plan:       Migraine without aura and without status migrainosus, not intractable  -     Topiramate (TOPAMAX) 100 MG tablet; Take 2 tablets (200 mg total) by mouth once daily.  Dispense: 60 tablet; Refill: 2  -     Rizatriptan (MAXALT) 10 MG tablet; Take 1 tablet (10 mg total) by mouth as needed for Migraine. May repeat dose in 2 hours if needed, maximum 2 tabs in a 24 hour period. Dispense: 18 tablet; Refill: 2  -     Ondansetron (ZOFRAN-ODT) 4 MG TbDL; Take 1 tablet (4 mg total) by mouth every 6 (six) hours as needed (nausea).  Dispense: 20 tablet; Refill: 2  -     Ambulatory referral/consult to Neurology; Future; Expected date: 11/18/2022    HSV infection  -     valACYclovir (VALTREX) 1000 MG tablet; Take 1 tablet (1,000 mg total) by mouth once daily.  Dispense: 30 tablet; Refill: 5    Major depression, chronic  DARIO (generalized anxiety disorder)  Attention deficit hyperactivity disorder (ADHD), predominantly inattentive type       -      follow up with Psychiatry as scheduled, meds as prescribed.

## 2022-11-15 ENCOUNTER — TELEPHONE (OUTPATIENT)
Dept: NEUROLOGY | Facility: CLINIC | Age: 22
End: 2022-11-15

## 2022-11-15 NOTE — TELEPHONE ENCOUNTER
----- Message from Gisela Tabor sent at 11/14/2022 10:54 AM CST -----  Dr. Vika Paul has put in a referral for Consult to Neurology. Please assist in scheduling.    Migraine without aura and without status migrainosus, not intractable [G43.009]    Thanks

## 2022-11-16 ENCOUNTER — OFFICE VISIT (OUTPATIENT)
Dept: PSYCHIATRY | Facility: CLINIC | Age: 22
End: 2022-11-16
Payer: COMMERCIAL

## 2022-11-16 VITALS
DIASTOLIC BLOOD PRESSURE: 61 MMHG | WEIGHT: 101.44 LBS | SYSTOLIC BLOOD PRESSURE: 104 MMHG | HEART RATE: 92 BPM | BODY MASS INDEX: 20.48 KG/M2

## 2022-11-16 DIAGNOSIS — F32.9 MAJOR DEPRESSION, CHRONIC: Primary | ICD-10-CM

## 2022-11-16 DIAGNOSIS — F41.1 GAD (GENERALIZED ANXIETY DISORDER): ICD-10-CM

## 2022-11-16 DIAGNOSIS — F90.0 ATTENTION DEFICIT HYPERACTIVITY DISORDER (ADHD), PREDOMINANTLY INATTENTIVE TYPE: ICD-10-CM

## 2022-11-16 PROCEDURE — 99215 OFFICE O/P EST HI 40 MIN: CPT | Mod: S$GLB,,, | Performed by: STUDENT IN AN ORGANIZED HEALTH CARE EDUCATION/TRAINING PROGRAM

## 2022-11-16 PROCEDURE — 99999 PR PBB SHADOW E&M-EST. PATIENT-LVL II: CPT | Mod: PBBFAC,,, | Performed by: STUDENT IN AN ORGANIZED HEALTH CARE EDUCATION/TRAINING PROGRAM

## 2022-11-16 PROCEDURE — 3078F PR MOST RECENT DIASTOLIC BLOOD PRESSURE < 80 MM HG: ICD-10-PCS | Mod: CPTII,S$GLB,, | Performed by: STUDENT IN AN ORGANIZED HEALTH CARE EDUCATION/TRAINING PROGRAM

## 2022-11-16 PROCEDURE — 99215 PR OFFICE/OUTPT VISIT, EST, LEVL V, 40-54 MIN: ICD-10-PCS | Mod: S$GLB,,, | Performed by: STUDENT IN AN ORGANIZED HEALTH CARE EDUCATION/TRAINING PROGRAM

## 2022-11-16 PROCEDURE — 3008F BODY MASS INDEX DOCD: CPT | Mod: CPTII,S$GLB,, | Performed by: STUDENT IN AN ORGANIZED HEALTH CARE EDUCATION/TRAINING PROGRAM

## 2022-11-16 PROCEDURE — 3074F PR MOST RECENT SYSTOLIC BLOOD PRESSURE < 130 MM HG: ICD-10-PCS | Mod: CPTII,S$GLB,, | Performed by: STUDENT IN AN ORGANIZED HEALTH CARE EDUCATION/TRAINING PROGRAM

## 2022-11-16 PROCEDURE — 3078F DIAST BP <80 MM HG: CPT | Mod: CPTII,S$GLB,, | Performed by: STUDENT IN AN ORGANIZED HEALTH CARE EDUCATION/TRAINING PROGRAM

## 2022-11-16 PROCEDURE — 3008F PR BODY MASS INDEX (BMI) DOCUMENTED: ICD-10-PCS | Mod: CPTII,S$GLB,, | Performed by: STUDENT IN AN ORGANIZED HEALTH CARE EDUCATION/TRAINING PROGRAM

## 2022-11-16 PROCEDURE — 99999 PR PBB SHADOW E&M-EST. PATIENT-LVL II: ICD-10-PCS | Mod: PBBFAC,,, | Performed by: STUDENT IN AN ORGANIZED HEALTH CARE EDUCATION/TRAINING PROGRAM

## 2022-11-16 PROCEDURE — 3074F SYST BP LT 130 MM HG: CPT | Mod: CPTII,S$GLB,, | Performed by: STUDENT IN AN ORGANIZED HEALTH CARE EDUCATION/TRAINING PROGRAM

## 2022-11-16 RX ORDER — METHYLPHENIDATE HYDROCHLORIDE 27 MG/1
27 TABLET ORAL EVERY MORNING
Qty: 30 TABLET | Refills: 0 | Status: SHIPPED | OUTPATIENT
Start: 2022-11-16 | End: 2022-12-07 | Stop reason: SDUPTHER

## 2022-11-17 NOTE — PROGRESS NOTES
I have reviewed the notes, assessments, and/or procedures performed by Dr. Donna Mcgee, I concur with her/his documentation of Ashley Ayala.

## 2022-11-18 ENCOUNTER — LAB VISIT (OUTPATIENT)
Dept: LAB | Facility: HOSPITAL | Age: 22
End: 2022-11-18
Payer: COMMERCIAL

## 2022-11-18 ENCOUNTER — OFFICE VISIT (OUTPATIENT)
Dept: NEUROLOGY | Facility: CLINIC | Age: 22
End: 2022-11-18
Payer: COMMERCIAL

## 2022-11-18 ENCOUNTER — PATIENT MESSAGE (OUTPATIENT)
Dept: NEUROLOGY | Facility: CLINIC | Age: 22
End: 2022-11-18

## 2022-11-18 VITALS
HEIGHT: 59 IN | HEART RATE: 86 BPM | WEIGHT: 100.06 LBS | SYSTOLIC BLOOD PRESSURE: 101 MMHG | BODY MASS INDEX: 20.17 KG/M2 | DIASTOLIC BLOOD PRESSURE: 68 MMHG

## 2022-11-18 DIAGNOSIS — G43.009 MIGRAINE WITHOUT AURA AND WITHOUT STATUS MIGRAINOSUS, NOT INTRACTABLE: ICD-10-CM

## 2022-11-18 DIAGNOSIS — G47.00 INSOMNIA, UNSPECIFIED TYPE: Primary | ICD-10-CM

## 2022-11-18 DIAGNOSIS — G43.109 MIGRAINE WITH AURA AND WITHOUT STATUS MIGRAINOSUS, NOT INTRACTABLE: ICD-10-CM

## 2022-11-18 PROCEDURE — 3078F DIAST BP <80 MM HG: CPT | Mod: CPTII,S$GLB,, | Performed by: PHYSICIAN ASSISTANT

## 2022-11-18 PROCEDURE — 84207 ASSAY OF VITAMIN B-6: CPT | Performed by: PHYSICIAN ASSISTANT

## 2022-11-18 PROCEDURE — 1160F PR REVIEW ALL MEDS BY PRESCRIBER/CLIN PHARMACIST DOCUMENTED: ICD-10-PCS | Mod: CPTII,S$GLB,, | Performed by: PHYSICIAN ASSISTANT

## 2022-11-18 PROCEDURE — 36415 COLL VENOUS BLD VENIPUNCTURE: CPT | Performed by: PHYSICIAN ASSISTANT

## 2022-11-18 PROCEDURE — 83921 ORGANIC ACID SINGLE QUANT: CPT | Performed by: PHYSICIAN ASSISTANT

## 2022-11-18 PROCEDURE — 3008F PR BODY MASS INDEX (BMI) DOCUMENTED: ICD-10-PCS | Mod: CPTII,S$GLB,, | Performed by: PHYSICIAN ASSISTANT

## 2022-11-18 PROCEDURE — 3074F PR MOST RECENT SYSTOLIC BLOOD PRESSURE < 130 MM HG: ICD-10-PCS | Mod: CPTII,S$GLB,, | Performed by: PHYSICIAN ASSISTANT

## 2022-11-18 PROCEDURE — 99205 PR OFFICE/OUTPT VISIT, NEW, LEVL V, 60-74 MIN: ICD-10-PCS | Mod: S$GLB,,, | Performed by: PHYSICIAN ASSISTANT

## 2022-11-18 PROCEDURE — 1160F RVW MEDS BY RX/DR IN RCRD: CPT | Mod: CPTII,S$GLB,, | Performed by: PHYSICIAN ASSISTANT

## 2022-11-18 PROCEDURE — 84252 ASSAY OF VITAMIN B-2: CPT | Performed by: PHYSICIAN ASSISTANT

## 2022-11-18 PROCEDURE — 99999 PR PBB SHADOW E&M-EST. PATIENT-LVL IV: ICD-10-PCS | Mod: PBBFAC,,, | Performed by: PHYSICIAN ASSISTANT

## 2022-11-18 PROCEDURE — 82607 VITAMIN B-12: CPT | Performed by: PHYSICIAN ASSISTANT

## 2022-11-18 PROCEDURE — 3078F PR MOST RECENT DIASTOLIC BLOOD PRESSURE < 80 MM HG: ICD-10-PCS | Mod: CPTII,S$GLB,, | Performed by: PHYSICIAN ASSISTANT

## 2022-11-18 PROCEDURE — 84425 ASSAY OF VITAMIN B-1: CPT | Performed by: PHYSICIAN ASSISTANT

## 2022-11-18 PROCEDURE — 3008F BODY MASS INDEX DOCD: CPT | Mod: CPTII,S$GLB,, | Performed by: PHYSICIAN ASSISTANT

## 2022-11-18 PROCEDURE — 99999 PR PBB SHADOW E&M-EST. PATIENT-LVL IV: CPT | Mod: PBBFAC,,, | Performed by: PHYSICIAN ASSISTANT

## 2022-11-18 PROCEDURE — 1159F MED LIST DOCD IN RCRD: CPT | Mod: CPTII,S$GLB,, | Performed by: PHYSICIAN ASSISTANT

## 2022-11-18 PROCEDURE — 1159F PR MEDICATION LIST DOCUMENTED IN MEDICAL RECORD: ICD-10-PCS | Mod: CPTII,S$GLB,, | Performed by: PHYSICIAN ASSISTANT

## 2022-11-18 PROCEDURE — 3074F SYST BP LT 130 MM HG: CPT | Mod: CPTII,S$GLB,, | Performed by: PHYSICIAN ASSISTANT

## 2022-11-18 PROCEDURE — 99205 OFFICE O/P NEW HI 60 MIN: CPT | Mod: S$GLB,,, | Performed by: PHYSICIAN ASSISTANT

## 2022-11-18 RX ORDER — FLUTICASONE PROPIONATE 50 MCG
SPRAY, SUSPENSION (ML) NASAL
COMMUNITY
Start: 2022-08-18

## 2022-11-18 RX ORDER — ERENUMAB-AOOE 70 MG/ML
70 INJECTION SUBCUTANEOUS
Qty: 1 ML | Refills: 11 | Status: SHIPPED | OUTPATIENT
Start: 2022-11-18

## 2022-11-18 RX ORDER — UBROGEPANT 50 MG/1
50 TABLET ORAL ONCE AS NEEDED
Qty: 10 TABLET | Refills: 11 | Status: SHIPPED | OUTPATIENT
Start: 2022-11-18 | End: 2023-02-27 | Stop reason: CLARIF

## 2022-11-18 RX ORDER — ONDANSETRON 4 MG/1
8 TABLET, ORALLY DISINTEGRATING ORAL EVERY 8 HOURS PRN
Qty: 20 TABLET | Refills: 2 | Status: SHIPPED | OUTPATIENT
Start: 2022-11-18

## 2022-11-18 NOTE — PROGRESS NOTES
Name: Ashley Ayala  Date: 11/18/2022  YOB: 2000      Subjective     Chief Complaint- Headache      HPI:   Ashley Ayala is a 22 y.o. who presents to clinic for evaluation of headache. On chart review the patient has been seen by PCP and was given Topamax 100mg daily and rizatriptan 10mg for acute relief of her migraines. Of note the patient has a lot of difficulty with sleep difficulty reports poor sleep for one year.     Headache history:   Age of onset -  Childhood, gotten worse as she's gotten older but in the past 6 months they have gotten progressively worse  Location - All over  Nature of pain -  Throbbing, swelling  Prodrome - Denies  Aura -  Denies  Duration of headache - 8 hours- 24 hours  Time to peak intensity - Unknown   Pain scale - range of intensity - 6-8 /10  Frequency -  >15 days a month with migraine, states daily but will occasionally have one or two days without  Status Migrainosus history - Denies  Time of day of most headaches- anytime     Family History- Mom, Maternal Grandparents    Associated symptoms with the headache, bolded items are positive:   Meningeal symptoms - photophobia, phonophobia, exercise intolerance   Nausea/vomitting  Nasal drainage   Visual blurriness   Pallor/flushing  Dizziness   Vertigo  Confusion  Impaired concentration   Pain worsened with physical activity   Neck pain     Cluster headache symptoms, bolded items are positive:   Swollen or droopy eyelid  Swelling under or around the eye (may affect both eyes)  Excessive tearing  Red eye  Rhinorrhea or one-sided nasal congestion   Red, flushed face   Forehead and facial sweating    Symptoms of increased intracranial pressure, bolded items are positive:   Whooshing sounds   Visual spots/blotches     Basilar migraine symptoms, bolded items are positive:  Dysarthria  Vertigo  Tinnitus  Hypacusia  Diplopia  Simultaneous visual symptoms in both temporal and nasal fields of both eyes  Ataxia  Reduced  level of consciousness  Bilateral paresthesias    Headache Triggers, bolded items are positive:  Bright or flickering light  Strong smell  Vigorous exercise  Dietary factors   Visual strain   Weather changes  Exertion  Heat (hot weather, hot baths or showers)  Menses      Treatment history:  Resolution of headache with sleep - yes   Meds tried:  Topamax  Rizatriptan      Headache risk factors:   H/o TBI  - no   H/o Meningitis  - no   F/h Aneurysms  - no      PAST MEDICAL HISTORY:  Past Medical History:   Diagnosis Date    ADHD (attention deficit hyperactivity disorder)     Anxiety     Depression     GERD (gastroesophageal reflux disease)     Irritable bowel syndrome with constipation     Migraine        PAST SURGICAL HISTORY:  History reviewed. No pertinent surgical history.    CURRENT MEDS:  Current Outpatient Medications   Medication Sig Dispense Refill    buPROPion (WELLBUTRIN XL) 150 MG TB24 tablet Take 1 tablet (150 mg total) by mouth once daily. 30 tablet 3    buPROPion (WELLBUTRIN XL) 300 MG 24 hr tablet Take 1 tablet (300 mg total) by mouth once daily. 30 tablet 3    fluticasone propionate (FLONASE) 50 mcg/actuation nasal spray by Each Nostril route.      lamoTRIgine (LAMICTAL) 200 MG tablet Take 1 tablet (200 mg total) by mouth once daily. 30 tablet 3    levonorgestreL (BIMAL) 14 mcg/24 hrs (3 yrs) 13.5 mg IUD 1 each by Intrauterine route once.      methylphenidate HCl 27 MG CR tablet Take 1 tablet (27 mg total) by mouth every morning. 30 tablet 0    methylphenidate HCl 27 MG CR tablet Take 1 tablet (27 mg total) by mouth every morning. 30 tablet 0    mirtazapine (REMERON) 30 MG tablet Take 1 tablet (30 mg total) by mouth every evening. 30 tablet 3    ondansetron (ZOFRAN-ODT) 4 MG TbDL Take 1 tablet (4 mg total) by mouth every 6 (six) hours as needed (nausea). 20 tablet 2    rizatriptan (MAXALT) 10 MG tablet Take 1 tablet (10 mg total) by mouth as needed for Migraine. May repeat dose in 2 hours if needed,  maximum 2 tabs in a 24 hour period. 18 tablet 2    topiramate (TOPAMAX) 100 MG tablet Take 2 tablets (200 mg total) by mouth once daily. 60 tablet 2    valACYclovir (VALTREX) 1000 MG tablet Take 1 tablet (1,000 mg total) by mouth once daily. 30 tablet 5    erenumab-aooe (AIMOVIG AUTOINJECTOR) 70 mg/mL autoinjector Inject 1 mL (70 mg total) into the skin every 28 days. 1 mL 11    ubrogepant (UBRELVY) 50 mg tablet Take 1 tablet (50 mg total) by mouth once as needed for Migraine. 10 tablet 11     No current facility-administered medications for this visit.       ALLERGIES:  Review of patient's allergies indicates:   Allergen Reactions    Tea tree Hives and Itching       FAMILY HISTORY:  Family History   Problem Relation Age of Onset    Depression Mother     Thyroid disease Mother     Depression Father     Drug abuse Father     Depression Maternal Grandmother     Arthritis Maternal Grandmother     Depression Maternal Grandfather     Hypertension Maternal Grandfather     Depression Paternal Grandmother     Depression Paternal Grandfather        SOCIAL HISTORY:  Social History     Tobacco Use    Smoking status: Some Days    Smokeless tobacco: Never    Tobacco comments:     Occasional social smoker.   Substance Use Topics    Alcohol use: Yes     Alcohol/week: 1.0 standard drink     Types: 1 Cans of beer per week    Drug use: Never         Review of Systems:  ROS      Objective   Vitals:    11/18/22 1112   BP: 101/68   Pulse: 86           NEUROLOGICAL EXAMINATION:     MENTAL STATUS   Oriented to person, place, and time.   Level of consciousness: alert    CRANIAL NERVES     CN III, IV, VI   Pupils are equal, round, and reactive to light.  Extraocular motions are normal.     CN V   Facial sensation intact.     CN VII   Facial expression full, symmetric.     CN VIII   CN VIII normal.     CN IX, X   CN IX normal.   CN X normal.     CN XI   CN XI normal.     CN XII   CN XII normal.     MOTOR EXAM   Muscle bulk:  normal    Strength   Strength 5/5 throughout.     REFLEXES     Reflexes   Right brachioradialis: 1+  Left brachioradialis: 1+  Right biceps: 1+  Left biceps: 1+  Right triceps: 1+  Left triceps: 1+  Right patellar: 1+  Left patellar: 1+  Right achilles: 1+  Left achilles: 1+  Right plantar: normal  Left plantar: normal  Right Hameed: absent  Left Hameed: absent  Right ankle clonus: absent  Left ankle clonus: absent    SENSORY EXAM   Light touch normal.   Vibration normal.   Proprioception normal.     GAIT AND COORDINATION     Gait  Gait: normal     Coordination   Finger to nose coordination: normal  Heel to shin coordination: normal    Tremor   Resting tremor: absent  Intention tremor: absent        Latest Imaging:         Assessment     Ashley Ayala is a 22 y.o. who presented to clinic today for the evaluation of migraine.        Plan     My approach to every patient is multimodal.   I focused our discussion on addressing modifiable risk factors and trying to decrease them.  After discussion with the patient, I recommend the followin. Preventive medications; these medications have to be taken every single day to decrease headache frequency and severity; it takes at least minimum of few weeks to see any results; and have to be taken for at least 1 to 2 years. The medication should be started at a small dose, and increased if no significant side effects. Patient compliance is key for effectiveness of the preventive medications. (we discussed the options of beta-blockers, calcium channel blockers, SSRIs, SNRIs, neuron modulating like topiramate options)     Patient is currently on SSRI therefore does not qualify for additional SSRI/SNRI due to concern for serotonin syndrome    Patient with hx of low BP therefore Beta-blocker/CCB would not be indicated for relief of symptoms    Failed:   Topamax  Amitriptyline    It is medically necessary for the patient to be on CGRP antagonist for prevention of  migraines     START:   Aimovig 70mg/mL     2. Acute (abortive) medications- these medications are to be taken only at the onset of severe headache and please limit a total of any combination of these medications to less than 6 days per month on average because they can cause rebound (medication overuse) headaches.     Failed:   Rizatriptan  Sumatriptan     Patient has tried and failed two triptan therapies without resolve of her symptoms therefore it is medically necessary for the patient have a CGRP antagonist for acute relief of migraine symptoms    START:     3. Natural approaches to increasing brain energy and serotonin:   Magnesium 400 mg daily  Vitamin B2 400 mg daily   Vitamin B12 1000 mcg daily      4. DIET: I recommend a diet that heavily favors fruits and vegetables while reducing carbs. More information is available upon request.     5. EXERCISE: Gentle movement exercises, concentrate on combination of muscle building and aerobic. I also recommend adding gentle Yoga therapy. The goal is 150 minutes per week of moderate to rigorous exercise, but you should start at your own pace and progress slowly and safely as discussed today.    6. ANXIETY/STRESS- Control stressors with yoga, meditation, counseling, or other methods of mindfulness.     7. SLEEP- aim for 7-8 hrs of restful sleep per night.     8. FUN- Increase fun time spend with friends and family     Benefits, side effects, and alternatives were discussed extensively with the patient.?     Ashley verbally endorsed understanding of all the recommendations.?     Follow Up in 3 months    With this medication it was discussed with the patient that unintended spontaneous  may occur and patient was encouraged to use barrier contraception in order to prevent this from occurring. The patient voiced understanding of this.     Problem List Items Addressed This Visit          Neuro    Migraine with aura    Overview     Onset in childhood  Patient is  currently on SSRI therefore does not qualify for additional SSRI/SNRI due to concern for serotonin syndrome    Patient with hx of low BP therefore Beta-blocker/CCB would not be indicated for relief of symptoms    It is medically necessary for the patient to have CGRP antagonist for prevention and abortive treatment due to patient failure of two different triptans and inability to have effect on the patients migraines and greater than 15 days.        Current Regimen  Topamax   Rizatriptan PRN    Ubrelvy PRN   Aimovig Q28 days    Tried and Failed Therapy:   Topamax   Rizatriptan  Zofran  Amitriptyline  Wellbutrin             Other Visit Diagnoses       Insomnia, unspecified type    -  Primary    Relevant Orders    Ambulatory referral/consult to Sleep Disorders    Migraine without aura and without status migrainosus, not intractable        Relevant Medications    ubrogepant (UBRELVY) 50 mg tablet    erenumab-aooe (AIMOVIG AUTOINJECTOR) 70 mg/mL autoinjector    Other Relevant Orders    VITAMIN B6    Vitamin B2    Vitamin B1    Vitamin B12 Deficiency Panel                I spent a total of 60 minutes on the day of the visit. This includes face to face time and non-face to face time preparing to see the patient (eg, review of tests), obtaining and/or reviewing separately obtained history, documenting clinical information in the electronic or other health record, independently interpreting results and communicating results to the patient/family/caregiver, or care coordinator.      Kary Brantley PA-C  Supervising Physician Fede Willis MD was avalible for all questions during this exam.  [facility]

## 2022-11-21 DIAGNOSIS — R79.89 LOW VITAMIN B12 LEVEL: Primary | ICD-10-CM

## 2022-11-21 LAB — VIT B12 SERPL-MCNC: 256 NG/L (ref 180–914)

## 2022-11-21 RX ORDER — CYANOCOBALAMIN 1000 UG/ML
INJECTION, SOLUTION INTRAMUSCULAR; SUBCUTANEOUS
Qty: 1 ML | Refills: 4 | Status: SHIPPED | OUTPATIENT
Start: 2022-11-21 | End: 2023-03-21

## 2022-11-22 ENCOUNTER — TELEPHONE (OUTPATIENT)
Dept: PHARMACY | Facility: CLINIC | Age: 22
End: 2022-11-22
Payer: COMMERCIAL

## 2022-11-22 LAB
PYRIDOXAL SERPL-MCNC: 9 UG/L (ref 5–50)
VIT B1 BLD-MCNC: 75 UG/L (ref 38–122)
VIT B2 SERPL-MCNC: 3 MCG/L (ref 1–19)

## 2022-11-22 NOTE — TELEPHONE ENCOUNTER
DOCUMENTATION ONLY  Ubrelvy  Approval date: 11/21/2022 to 11/21/2023   Case ID# [id number]     Aimovig   Approval date: 11/21/2022 to 5/21/2023   Case ID# [id number]

## 2022-11-23 LAB — METHYLMALONATE SERPL-SCNC: 0.22 NMOL/ML

## 2022-12-07 ENCOUNTER — OFFICE VISIT (OUTPATIENT)
Dept: PSYCHIATRY | Facility: CLINIC | Age: 22
End: 2022-12-07
Payer: COMMERCIAL

## 2022-12-07 VITALS
HEIGHT: 59 IN | WEIGHT: 99.56 LBS | BODY MASS INDEX: 20.07 KG/M2 | DIASTOLIC BLOOD PRESSURE: 54 MMHG | SYSTOLIC BLOOD PRESSURE: 92 MMHG | HEART RATE: 91 BPM

## 2022-12-07 DIAGNOSIS — F90.0 ATTENTION DEFICIT HYPERACTIVITY DISORDER (ADHD), PREDOMINANTLY INATTENTIVE TYPE: ICD-10-CM

## 2022-12-07 DIAGNOSIS — F32.9 MAJOR DEPRESSION, CHRONIC: Primary | ICD-10-CM

## 2022-12-07 DIAGNOSIS — F41.1 GAD (GENERALIZED ANXIETY DISORDER): ICD-10-CM

## 2022-12-07 PROCEDURE — 99999 PR PBB SHADOW E&M-EST. PATIENT-LVL II: CPT | Mod: PBBFAC,,, | Performed by: STUDENT IN AN ORGANIZED HEALTH CARE EDUCATION/TRAINING PROGRAM

## 2022-12-07 PROCEDURE — 3074F PR MOST RECENT SYSTOLIC BLOOD PRESSURE < 130 MM HG: ICD-10-PCS | Mod: CPTII,S$GLB,, | Performed by: STUDENT IN AN ORGANIZED HEALTH CARE EDUCATION/TRAINING PROGRAM

## 2022-12-07 PROCEDURE — 3074F SYST BP LT 130 MM HG: CPT | Mod: CPTII,S$GLB,, | Performed by: STUDENT IN AN ORGANIZED HEALTH CARE EDUCATION/TRAINING PROGRAM

## 2022-12-07 PROCEDURE — 99214 PR OFFICE/OUTPT VISIT, EST, LEVL IV, 30-39 MIN: ICD-10-PCS | Mod: S$GLB,,, | Performed by: STUDENT IN AN ORGANIZED HEALTH CARE EDUCATION/TRAINING PROGRAM

## 2022-12-07 PROCEDURE — 99214 OFFICE O/P EST MOD 30 MIN: CPT | Mod: S$GLB,,, | Performed by: STUDENT IN AN ORGANIZED HEALTH CARE EDUCATION/TRAINING PROGRAM

## 2022-12-07 PROCEDURE — 3008F PR BODY MASS INDEX (BMI) DOCUMENTED: ICD-10-PCS | Mod: CPTII,S$GLB,, | Performed by: STUDENT IN AN ORGANIZED HEALTH CARE EDUCATION/TRAINING PROGRAM

## 2022-12-07 PROCEDURE — 99999 PR PBB SHADOW E&M-EST. PATIENT-LVL II: ICD-10-PCS | Mod: PBBFAC,,, | Performed by: STUDENT IN AN ORGANIZED HEALTH CARE EDUCATION/TRAINING PROGRAM

## 2022-12-07 PROCEDURE — 3008F BODY MASS INDEX DOCD: CPT | Mod: CPTII,S$GLB,, | Performed by: STUDENT IN AN ORGANIZED HEALTH CARE EDUCATION/TRAINING PROGRAM

## 2022-12-07 PROCEDURE — 3078F PR MOST RECENT DIASTOLIC BLOOD PRESSURE < 80 MM HG: ICD-10-PCS | Mod: CPTII,S$GLB,, | Performed by: STUDENT IN AN ORGANIZED HEALTH CARE EDUCATION/TRAINING PROGRAM

## 2022-12-07 PROCEDURE — 3078F DIAST BP <80 MM HG: CPT | Mod: CPTII,S$GLB,, | Performed by: STUDENT IN AN ORGANIZED HEALTH CARE EDUCATION/TRAINING PROGRAM

## 2022-12-07 RX ORDER — METHYLPHENIDATE HYDROCHLORIDE 27 MG/1
27 TABLET ORAL EVERY MORNING
Qty: 30 TABLET | Refills: 0 | Status: SHIPPED | OUTPATIENT
Start: 2022-12-07 | End: 2022-12-27 | Stop reason: SDUPTHER

## 2022-12-07 RX ORDER — METHYLPHENIDATE HYDROCHLORIDE 27 MG/1
27 TABLET ORAL EVERY MORNING
Qty: 30 TABLET | Refills: 0 | Status: SHIPPED | OUTPATIENT
Start: 2023-01-04 | End: 2023-02-01 | Stop reason: SDUPTHER

## 2022-12-07 RX ORDER — LAMOTRIGINE 25 MG/1
50 TABLET ORAL DAILY
Qty: 60 TABLET | Refills: 3 | Status: SHIPPED | OUTPATIENT
Start: 2022-12-07 | End: 2023-01-04 | Stop reason: SDUPTHER
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
## 2022-12-27 ENCOUNTER — TELEPHONE (OUTPATIENT)
Dept: PSYCHIATRY | Facility: CLINIC | Age: 22
End: 2022-12-27
Payer: COMMERCIAL

## 2022-12-27 DIAGNOSIS — F90.0 ATTENTION DEFICIT HYPERACTIVITY DISORDER (ADHD), PREDOMINANTLY INATTENTIVE TYPE: ICD-10-CM

## 2022-12-27 RX ORDER — METHYLPHENIDATE HYDROCHLORIDE 27 MG/1
27 TABLET ORAL EVERY MORNING
Qty: 30 TABLET | Refills: 0 | Status: SHIPPED | OUTPATIENT
Start: 2022-12-27 | End: 2023-02-01 | Stop reason: SDUPTHER

## 2022-12-27 RX ORDER — METHYLPHENIDATE HYDROCHLORIDE 27 MG/1
27 TABLET ORAL EVERY MORNING
Qty: 30 TABLET | Refills: 0 | Status: CANCELLED | OUTPATIENT
Start: 2023-01-04

## 2022-12-27 NOTE — TELEPHONE ENCOUNTER
----- Message from Viola Geronimo sent at 12/27/2022  1:56 PM CST -----  Patient is calling requesting to have meds methylphenidate HCl 27 MG CR tablet sent to [facility]    [facility] due to shortage issue ..  [phone]

## 2023-01-04 ENCOUNTER — OFFICE VISIT (OUTPATIENT)
Dept: PSYCHIATRY | Facility: CLINIC | Age: 23
End: 2023-01-04
Payer: COMMERCIAL

## 2023-01-04 VITALS
WEIGHT: 96.13 LBS | BODY MASS INDEX: 19.41 KG/M2 | SYSTOLIC BLOOD PRESSURE: 93 MMHG | HEART RATE: 73 BPM | DIASTOLIC BLOOD PRESSURE: 53 MMHG

## 2023-01-04 DIAGNOSIS — F60.3 BORDERLINE PERSONALITY DISORDER: ICD-10-CM

## 2023-01-04 DIAGNOSIS — F32.9 MAJOR DEPRESSION, CHRONIC: Primary | ICD-10-CM

## 2023-01-04 DIAGNOSIS — F41.1 GAD (GENERALIZED ANXIETY DISORDER): ICD-10-CM

## 2023-01-04 PROCEDURE — 3008F PR BODY MASS INDEX (BMI) DOCUMENTED: ICD-10-PCS | Mod: CPTII,S$GLB,, | Performed by: STUDENT IN AN ORGANIZED HEALTH CARE EDUCATION/TRAINING PROGRAM

## 2023-01-04 PROCEDURE — 3078F DIAST BP <80 MM HG: CPT | Mod: CPTII,S$GLB,, | Performed by: STUDENT IN AN ORGANIZED HEALTH CARE EDUCATION/TRAINING PROGRAM

## 2023-01-04 PROCEDURE — 99999 PR PBB SHADOW E&M-EST. PATIENT-LVL II: CPT | Mod: PBBFAC,,, | Performed by: STUDENT IN AN ORGANIZED HEALTH CARE EDUCATION/TRAINING PROGRAM

## 2023-01-04 PROCEDURE — 99214 PR OFFICE/OUTPT VISIT, EST, LEVL IV, 30-39 MIN: ICD-10-PCS | Mod: S$GLB,,, | Performed by: STUDENT IN AN ORGANIZED HEALTH CARE EDUCATION/TRAINING PROGRAM

## 2023-01-04 PROCEDURE — 99999 PR PBB SHADOW E&M-EST. PATIENT-LVL II: ICD-10-PCS | Mod: PBBFAC,,, | Performed by: STUDENT IN AN ORGANIZED HEALTH CARE EDUCATION/TRAINING PROGRAM

## 2023-01-04 PROCEDURE — 3078F PR MOST RECENT DIASTOLIC BLOOD PRESSURE < 80 MM HG: ICD-10-PCS | Mod: CPTII,S$GLB,, | Performed by: STUDENT IN AN ORGANIZED HEALTH CARE EDUCATION/TRAINING PROGRAM

## 2023-01-04 PROCEDURE — 3074F SYST BP LT 130 MM HG: CPT | Mod: CPTII,S$GLB,, | Performed by: STUDENT IN AN ORGANIZED HEALTH CARE EDUCATION/TRAINING PROGRAM

## 2023-01-04 PROCEDURE — 99214 OFFICE O/P EST MOD 30 MIN: CPT | Mod: S$GLB,,, | Performed by: STUDENT IN AN ORGANIZED HEALTH CARE EDUCATION/TRAINING PROGRAM

## 2023-01-04 PROCEDURE — 3074F PR MOST RECENT SYSTOLIC BLOOD PRESSURE < 130 MM HG: ICD-10-PCS | Mod: CPTII,S$GLB,, | Performed by: STUDENT IN AN ORGANIZED HEALTH CARE EDUCATION/TRAINING PROGRAM

## 2023-01-04 PROCEDURE — 3008F BODY MASS INDEX DOCD: CPT | Mod: CPTII,S$GLB,, | Performed by: STUDENT IN AN ORGANIZED HEALTH CARE EDUCATION/TRAINING PROGRAM

## 2023-01-04 RX ORDER — BUPROPION HYDROCHLORIDE 150 MG/1
150 TABLET ORAL DAILY
Qty: 30 TABLET | Refills: 3 | Status: SHIPPED | OUTPATIENT
Start: 2023-01-04 | End: 2023-04-12 | Stop reason: SDUPTHER

## 2023-01-04 RX ORDER — MIRTAZAPINE 30 MG/1
30 TABLET, FILM COATED ORAL NIGHTLY
Qty: 30 TABLET | Refills: 3 | Status: SHIPPED | OUTPATIENT
Start: 2023-01-04 | End: 2023-02-01

## 2023-01-04 RX ORDER — BUPROPION HYDROCHLORIDE 300 MG/1
300 TABLET ORAL DAILY
Qty: 30 TABLET | Refills: 3 | Status: SHIPPED | OUTPATIENT
Start: 2023-01-04 | End: 2023-04-12 | Stop reason: SDUPTHER

## 2023-01-04 RX ORDER — LAMOTRIGINE 200 MG/1
200 TABLET ORAL DAILY
Qty: 30 TABLET | Refills: 3 | Status: SHIPPED | OUTPATIENT
Start: 2023-01-04 | End: 2023-04-12 | Stop reason: SDUPTHER

## 2023-01-04 RX ORDER — LAMOTRIGINE 25 MG/1
50 TABLET ORAL DAILY
Qty: 60 TABLET | Refills: 3 | Status: SHIPPED | OUTPATIENT
Start: 2023-01-04 | End: 2023-04-12 | Stop reason: SDUPTHER
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
## 2023-01-10 NOTE — PROGRESS NOTES
I have reviewed the notes, assessments, and/or procedures performed by Dr. Donna Mcgee, I have discussed the case with Dr. Mcgee concur with her/his documentation of Ashley Ayala. Agree that she needs individual therapy and monitor need to refer to nutritionist and monitor weight given medications that she is on. If loses further weight, consider stopping Concerta.

## 2023-02-01 ENCOUNTER — OFFICE VISIT (OUTPATIENT)
Dept: PSYCHIATRY | Facility: CLINIC | Age: 23
End: 2023-02-01
Payer: COMMERCIAL

## 2023-02-01 VITALS
SYSTOLIC BLOOD PRESSURE: 89 MMHG | DIASTOLIC BLOOD PRESSURE: 55 MMHG | HEART RATE: 85 BPM | BODY MASS INDEX: 19.73 KG/M2 | WEIGHT: 97.88 LBS | HEIGHT: 59 IN

## 2023-02-01 DIAGNOSIS — F32.9 MAJOR DEPRESSION, CHRONIC: ICD-10-CM

## 2023-02-01 DIAGNOSIS — F90.0 ATTENTION DEFICIT HYPERACTIVITY DISORDER (ADHD), PREDOMINANTLY INATTENTIVE TYPE: ICD-10-CM

## 2023-02-01 PROCEDURE — 3074F PR MOST RECENT SYSTOLIC BLOOD PRESSURE < 130 MM HG: ICD-10-PCS | Mod: CPTII,S$GLB,, | Performed by: STUDENT IN AN ORGANIZED HEALTH CARE EDUCATION/TRAINING PROGRAM

## 2023-02-01 PROCEDURE — 3074F SYST BP LT 130 MM HG: CPT | Mod: CPTII,S$GLB,, | Performed by: STUDENT IN AN ORGANIZED HEALTH CARE EDUCATION/TRAINING PROGRAM

## 2023-02-01 PROCEDURE — 3078F PR MOST RECENT DIASTOLIC BLOOD PRESSURE < 80 MM HG: ICD-10-PCS | Mod: CPTII,S$GLB,, | Performed by: STUDENT IN AN ORGANIZED HEALTH CARE EDUCATION/TRAINING PROGRAM

## 2023-02-01 PROCEDURE — 99214 PR OFFICE/OUTPT VISIT, EST, LEVL IV, 30-39 MIN: ICD-10-PCS | Mod: S$GLB,,, | Performed by: STUDENT IN AN ORGANIZED HEALTH CARE EDUCATION/TRAINING PROGRAM

## 2023-02-01 PROCEDURE — 3008F PR BODY MASS INDEX (BMI) DOCUMENTED: ICD-10-PCS | Mod: CPTII,S$GLB,, | Performed by: STUDENT IN AN ORGANIZED HEALTH CARE EDUCATION/TRAINING PROGRAM

## 2023-02-01 PROCEDURE — 99999 PR PBB SHADOW E&M-EST. PATIENT-LVL II: CPT | Mod: PBBFAC,,, | Performed by: STUDENT IN AN ORGANIZED HEALTH CARE EDUCATION/TRAINING PROGRAM

## 2023-02-01 PROCEDURE — 99999 PR PBB SHADOW E&M-EST. PATIENT-LVL II: ICD-10-PCS | Mod: PBBFAC,,, | Performed by: STUDENT IN AN ORGANIZED HEALTH CARE EDUCATION/TRAINING PROGRAM

## 2023-02-01 PROCEDURE — 99214 OFFICE O/P EST MOD 30 MIN: CPT | Mod: S$GLB,,, | Performed by: STUDENT IN AN ORGANIZED HEALTH CARE EDUCATION/TRAINING PROGRAM

## 2023-02-01 PROCEDURE — 3008F BODY MASS INDEX DOCD: CPT | Mod: CPTII,S$GLB,, | Performed by: STUDENT IN AN ORGANIZED HEALTH CARE EDUCATION/TRAINING PROGRAM

## 2023-02-01 PROCEDURE — 3078F DIAST BP <80 MM HG: CPT | Mod: CPTII,S$GLB,, | Performed by: STUDENT IN AN ORGANIZED HEALTH CARE EDUCATION/TRAINING PROGRAM

## 2023-02-01 RX ORDER — METHYLPHENIDATE HYDROCHLORIDE 27 MG/1
27 TABLET ORAL EVERY MORNING
Qty: 30 TABLET | Refills: 0 | Status: SHIPPED | OUTPATIENT
Start: 2023-02-01 | End: 2023-03-08

## 2023-02-01 RX ORDER — METHYLPHENIDATE HYDROCHLORIDE 27 MG/1
27 TABLET ORAL EVERY MORNING
Qty: 30 TABLET | Refills: 0 | Status: SHIPPED | OUTPATIENT
Start: 2023-03-01 | End: 2023-03-08

## 2023-02-01 RX ORDER — MIRTAZAPINE 45 MG/1
45 TABLET, FILM COATED ORAL NIGHTLY
Qty: 30 TABLET | Refills: 1 | Status: SHIPPED | OUTPATIENT
Start: 2023-02-01 | End: 2023-03-08 | Stop reason: SDUPTHER
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
## 2023-02-27 ENCOUNTER — OFFICE VISIT (OUTPATIENT)
Dept: NEUROLOGY | Facility: CLINIC | Age: 23
End: 2023-02-27
Payer: COMMERCIAL

## 2023-02-27 ENCOUNTER — TELEPHONE (OUTPATIENT)
Dept: NEUROLOGY | Facility: CLINIC | Age: 23
End: 2023-02-27

## 2023-02-27 VITALS
DIASTOLIC BLOOD PRESSURE: 58 MMHG | HEART RATE: 100 BPM | WEIGHT: 99.75 LBS | BODY MASS INDEX: 20.11 KG/M2 | SYSTOLIC BLOOD PRESSURE: 92 MMHG | HEIGHT: 59 IN

## 2023-02-27 DIAGNOSIS — G43.009 MIGRAINE WITHOUT AURA AND WITHOUT STATUS MIGRAINOSUS, NOT INTRACTABLE: Primary | ICD-10-CM

## 2023-02-27 PROCEDURE — 3008F BODY MASS INDEX DOCD: CPT | Mod: CPTII,S$GLB,, | Performed by: PHYSICIAN ASSISTANT

## 2023-02-27 PROCEDURE — 99214 PR OFFICE/OUTPT VISIT, EST, LEVL IV, 30-39 MIN: ICD-10-PCS | Mod: S$GLB,,, | Performed by: PHYSICIAN ASSISTANT

## 2023-02-27 PROCEDURE — 3078F PR MOST RECENT DIASTOLIC BLOOD PRESSURE < 80 MM HG: ICD-10-PCS | Mod: CPTII,S$GLB,, | Performed by: PHYSICIAN ASSISTANT

## 2023-02-27 PROCEDURE — 1159F MED LIST DOCD IN RCRD: CPT | Mod: CPTII,S$GLB,, | Performed by: PHYSICIAN ASSISTANT

## 2023-02-27 PROCEDURE — 99999 PR PBB SHADOW E&M-EST. PATIENT-LVL V: ICD-10-PCS | Mod: PBBFAC,,, | Performed by: PHYSICIAN ASSISTANT

## 2023-02-27 PROCEDURE — 3078F DIAST BP <80 MM HG: CPT | Mod: CPTII,S$GLB,, | Performed by: PHYSICIAN ASSISTANT

## 2023-02-27 PROCEDURE — 99214 OFFICE O/P EST MOD 30 MIN: CPT | Mod: S$GLB,,, | Performed by: PHYSICIAN ASSISTANT

## 2023-02-27 PROCEDURE — 3074F PR MOST RECENT SYSTOLIC BLOOD PRESSURE < 130 MM HG: ICD-10-PCS | Mod: CPTII,S$GLB,, | Performed by: PHYSICIAN ASSISTANT

## 2023-02-27 PROCEDURE — 1160F PR REVIEW ALL MEDS BY PRESCRIBER/CLIN PHARMACIST DOCUMENTED: ICD-10-PCS | Mod: CPTII,S$GLB,, | Performed by: PHYSICIAN ASSISTANT

## 2023-02-27 PROCEDURE — 3074F SYST BP LT 130 MM HG: CPT | Mod: CPTII,S$GLB,, | Performed by: PHYSICIAN ASSISTANT

## 2023-02-27 PROCEDURE — 99999 PR PBB SHADOW E&M-EST. PATIENT-LVL V: CPT | Mod: PBBFAC,,, | Performed by: PHYSICIAN ASSISTANT

## 2023-02-27 PROCEDURE — 1159F PR MEDICATION LIST DOCUMENTED IN MEDICAL RECORD: ICD-10-PCS | Mod: CPTII,S$GLB,, | Performed by: PHYSICIAN ASSISTANT

## 2023-02-27 PROCEDURE — 3008F PR BODY MASS INDEX (BMI) DOCUMENTED: ICD-10-PCS | Mod: CPTII,S$GLB,, | Performed by: PHYSICIAN ASSISTANT

## 2023-02-27 PROCEDURE — 1160F RVW MEDS BY RX/DR IN RCRD: CPT | Mod: CPTII,S$GLB,, | Performed by: PHYSICIAN ASSISTANT

## 2023-02-27 RX ORDER — UBROGEPANT 100 MG/1
100 TABLET ORAL ONCE AS NEEDED
Qty: 10 TABLET | Refills: 11 | Status: SHIPPED | OUTPATIENT
Start: 2023-02-27 | End: 2023-02-27

## 2023-02-27 NOTE — PROGRESS NOTES
Name: Ashley Ayala  Date: 02/27/2023  YOB: 2000      Subjective     Chief Complaint- Headache    Interval History 02/27/2023    I have reviewed all relevant history in Epic. The patient presents for routine follow up regarding her migraines. She is currently managed with topamax 200mg daily, aimovig injections h19pock, and ubrelvy 50mg PRN for acute relief. She was also found to have incidental low B12 which she is currently resolving with IM injections. She notes that she is still having migraines daily or every other day. She notes that light is a big trigger. She notes that her headaches are regularly around a 7/10.       HPI:   Ashley Ayala is a 22 y.o. who presents to clinic for evaluation of headache. On chart review the patient has been seen by PCP and was given Topamax 100mg daily and rizatriptan 10mg for acute relief of her migraines. Of note the patient has a lot of difficulty with sleep difficulty reports poor sleep for one year.     Headache history:   Age of onset -  Childhood, gotten worse as she's gotten older but in the past 6 months they have gotten progressively worse  Location - All over  Nature of pain -  Throbbing, swelling  Prodrome - Denies  Aura -  Denies  Duration of headache - 8 hours- 24 hours  Time to peak intensity - Unknown   Pain scale - range of intensity - 6-8 /10  Frequency -  >15 days a month with migraine, states daily but will occasionally have one or two days without  Status Migrainosus history - Denies  Time of day of most headaches- anytime     Family History- Mom, Maternal Grandparents    Associated symptoms with the headache, bolded items are positive:   Meningeal symptoms - photophobia, phonophobia, exercise intolerance   Nausea/vomitting  Nasal drainage   Visual blurriness   Pallor/flushing  Dizziness   Vertigo  Confusion  Impaired concentration   Pain worsened with physical activity   Neck pain     Cluster headache symptoms, bolded items are  positive:   Swollen or droopy eyelid  Swelling under or around the eye (may affect both eyes)  Excessive tearing  Red eye  Rhinorrhea or one-sided nasal congestion   Red, flushed face   Forehead and facial sweating    Symptoms of increased intracranial pressure, bolded items are positive:   Whooshing sounds   Visual spots/blotches     Basilar migraine symptoms, bolded items are positive:  Dysarthria  Vertigo  Tinnitus  Hypacusia  Diplopia  Simultaneous visual symptoms in both temporal and nasal fields of both eyes  Ataxia  Reduced level of consciousness  Bilateral paresthesias    Headache Triggers, bolded items are positive:  Bright or flickering light  Strong smell  Vigorous exercise  Dietary factors   Visual strain   Weather changes  Exertion  Heat (hot weather, hot baths or showers)  Menses      Treatment history:  Resolution of headache with sleep - yes   Meds tried:  Topamax  Rizatriptan      Headache risk factors:   H/o TBI  - no   H/o Meningitis  - no   F/h Aneurysms  - no      PAST MEDICAL HISTORY:  Past Medical History:   Diagnosis Date    ADHD (attention deficit hyperactivity disorder)     Anxiety     Depression     GERD (gastroesophageal reflux disease)     Irritable bowel syndrome with constipation     Migraine        PAST SURGICAL HISTORY:  History reviewed. No pertinent surgical history.    CURRENT MEDS:  Current Outpatient Medications   Medication Sig Dispense Refill    buPROPion (WELLBUTRIN XL) 150 MG TB24 tablet Take 1 tablet (150 mg total) by mouth once daily. 30 tablet 3    buPROPion (WELLBUTRIN XL) 300 MG 24 hr tablet Take 1 tablet (300 mg total) by mouth once daily. 30 tablet 3    cyanocobalamin 1,000 mcg/mL injection Inject 1 mL (1,000 mcg total) into the muscle every 14 (fourteen) days for 30 days, THEN 1 mL (1,000 mcg total) every 28 days. 1 mL 4    erenumab-aooe (AIMOVIG AUTOINJECTOR) 70 mg/mL autoinjector Inject 1 mL (70 mg total) into the skin every 28 days. 1 mL 11    fluticasone  propionate (FLONASE) 50 mcg/actuation nasal spray by Each Nostril route.      insulin syringes, disposable, 1 mL Syrg 1 Units by Misc.(Non-Drug; Combo Route) route every 14 (fourteen) days. 6 each 0    lamoTRIgine (LAMICTAL) 200 MG tablet Take 1 tablet (200 mg total) by mouth once daily. 30 tablet 3    lamoTRIgine (LAMICTAL) 25 MG tablet Take 2 tablets (50 mg total) by mouth once daily. With 200 mg tablet, to total 250 mg daily 60 tablet 3    levonorgestreL (BIMAL) 14 mcg/24 hrs (3 yrs) 13.5 mg IUD 1 each by Intrauterine route once.      [START ON 3/1/2023] methylphenidate HCl 27 MG CR tablet Take 1 tablet (27 mg total) by mouth every morning. 30 tablet 0    methylphenidate HCl 27 MG CR tablet Take 1 tablet (27 mg total) by mouth every morning. 30 tablet 0    mirtazapine (REMERON) 45 MG tablet Take 1 tablet (45 mg total) by mouth every evening. 30 tablet 1    ondansetron (ZOFRAN-ODT) 4 MG TbDL Take 2 tablets (8 mg total) by mouth every 8 (eight) hours as needed (nausea). 20 tablet 2    topiramate (TOPAMAX) 100 MG tablet Take 2 tablets (200 mg total) by mouth once daily. 60 tablet 2    valACYclovir (VALTREX) 1000 MG tablet Take 1 tablet (1,000 mg total) by mouth once daily. 30 tablet 5    ubrogepant (UBRELVY) 100 mg tablet Take 1 tablet (100 mg total) by mouth once as needed for Migraine. 10 tablet 11     No current facility-administered medications for this visit.       ALLERGIES:  Review of patient's allergies indicates:   Allergen Reactions    Tea tree Hives and Itching       FAMILY HISTORY:  Family History   Problem Relation Age of Onset    Depression Mother     Thyroid disease Mother     Depression Father     Drug abuse Father     Depression Maternal Grandmother     Arthritis Maternal Grandmother     Depression Maternal Grandfather     Hypertension Maternal Grandfather     Depression Paternal Grandmother     Depression Paternal Grandfather        SOCIAL HISTORY:  Social History     Tobacco Use    Smoking status:  Some Days    Smokeless tobacco: Never    Tobacco comments:     Occasional social smoker.   Substance Use Topics    Alcohol use: Yes     Alcohol/week: 1.0 standard drink     Types: 1 Cans of beer per week    Drug use: Never         Review of Systems:  ROS      Objective   Vitals:    23 1026   BP: (!) 92/58   Pulse: 100             NEUROLOGICAL EXAMINATION:     MENTAL STATUS   Oriented to person, place, and time.   Level of consciousness: alert       Limited exam due to patient being late      CRANIAL NERVES     CN III, IV, VI   Pupils are equal, round, and reactive to light.  Extraocular motions are normal.     CN V   Facial sensation intact.     CN VII   Facial expression full, symmetric.     CN VIII   CN VIII normal.     CN IX, X   CN IX normal.   CN X normal.     CN XI   CN XI normal.     CN XII   CN XII normal.     MOTOR EXAM   Muscle bulk: normal    Strength   Strength 5/5 throughout.     REFLEXES     Reflexes   Right brachioradialis: 1+  Left brachioradialis: 1+  Right biceps: 1+  Left biceps: 1+  Right triceps: 1+  Left triceps: 1+  Right patellar: 1+  Left patellar: 1+  Right achilles: 1+  Left achilles: 1+  Right plantar: normal  Left plantar: normal  Right Hameed: absent  Left Hameed: absent  Right ankle clonus: absent  Left ankle clonus: absent    SENSORY EXAM   Light touch normal.   Vibration normal.   Proprioception normal.     GAIT AND COORDINATION     Gait  Gait: normal     Coordination   Finger to nose coordination: normal  Heel to shin coordination: normal    Tremor   Resting tremor: absent  Intention tremor: absent        Latest Imaging:         Assessment     Ashley Ayala is a 22 y.o. who presented to clinic today for the evaluation of migraine.        Plan     My approach to every patient is multimodal.   I focused our discussion on addressing modifiable risk factors and trying to decrease them.  After discussion with the patient, I recommend the followin. Preventive  medications; these medications have to be taken every single day to decrease headache frequency and severity; it takes at least minimum of few weeks to see any results; and have to be taken for at least 1 to 2 years. The medication should be started at a small dose, and increased if no significant side effects. Patient compliance is key for effectiveness of the preventive medications. (we discussed the options of beta-blockers, calcium channel blockers, SSRIs, SNRIs, neuron modulating like topiramate options)     Patient is currently on SSRI therefore does not qualify for additional SSRI/SNRI due to concern for serotonin syndrome    Patient with hx of low BP therefore Beta-blocker/CCB would not be indicated for relief of symptoms    Failed:   Topamax  Amitriptyline    It is medically necessary for the patient to be on CGRP antagonist for prevention of migraines     START:   Aimovig 70mg/mL     2. Acute (abortive) medications- these medications are to be taken only at the onset of severe headache and please limit a total of any combination of these medications to less than 6 days per month on average because they can cause rebound (medication overuse) headaches.     Failed:   Rizatriptan  Sumatriptan     Patient has tried and failed two triptan therapies without resolve of her symptoms therefore it is medically necessary for the patient have a CGRP antagonist for acute relief of migraine symptoms    START:   Ubrelvy 100mg     3. Natural approaches to increasing brain energy and serotonin:   Magnesium 400 mg daily  Vitamin B2 400 mg daily   Vitamin B12 1000 mcg daily      4. DIET: I recommend a diet that heavily favors fruits and vegetables while reducing carbs. More information is available upon request.     5. EXERCISE: Gentle movement exercises, concentrate on combination of muscle building and aerobic. I also recommend adding gentle Yoga therapy. The goal is 150 minutes per week of moderate to rigorous exercise,  but you should start at your own pace and progress slowly and safely as discussed today.    6. ANXIETY/STRESS- Control stressors with yoga, meditation, counseling, or other methods of mindfulness.     7. SLEEP- aim for 7-8 hrs of restful sleep per night.     8. FUN- Increase fun time spend with friends and family     Benefits, side effects, and alternatives were discussed extensively with the patient.?     Ashley verbally endorsed understanding of all the recommendations.?     Follow Up in 3 months    With this medication it was discussed with the patient that unintended spontaneous  may occur and patient was encouraged to use barrier contraception in order to prevent this from occurring. The patient voiced understanding of this.     Problem List Items Addressed This Visit    None  Visit Diagnoses       Migraine without aura and without status migrainosus, not intractable    -  Primary    Relevant Medications    ubrogepant (UBRELVY) 100 mg tablet    Other Relevant Orders    Ambulatory referral/consult to Physical/Occupational Therapy              I spent a total of 60 minutes on the day of the visit. This includes face to face time and non-face to face time preparing to see the patient (eg, review of tests), obtaining and/or reviewing separately obtained history, documenting clinical information in the electronic or other health record, independently interpreting results and communicating results to the patient/family/caregiver, or care coordinator.      Kary Brantley PA-C  Supervising Physician Fede Willis MD was avalible for all questions during this exam.  [facility]

## 2023-02-27 NOTE — TELEPHONE ENCOUNTER
Called Gillian at pt's pharmacy to clarify max dosage of ubrelvy in 24 hours. Stated it is 200 mg. Gillian verbalized understanding.

## 2023-02-27 NOTE — TELEPHONE ENCOUNTER
----- Message from [place] Chevalier sent at 2/27/2023  1:42 PM CST -----  Regarding: RX clarity   Gillian from [facility] for Dr. Karon groves  RX ubrogepant (UBRELVY) 100 mg tablet. Caller says need to verify directions. [facility] to give max to take in one day @ [phone].

## 2023-03-01 ENCOUNTER — CLINICAL SUPPORT (OUTPATIENT)
Dept: REHABILITATION | Facility: OTHER | Age: 23
End: 2023-03-01
Payer: COMMERCIAL

## 2023-03-01 DIAGNOSIS — G44.201 ACUTE INTRACTABLE TENSION-TYPE HEADACHE: ICD-10-CM

## 2023-03-01 DIAGNOSIS — G43.009 MIGRAINE WITHOUT AURA AND WITHOUT STATUS MIGRAINOSUS, NOT INTRACTABLE: ICD-10-CM

## 2023-03-01 PROBLEM — G44.209 TENSION TYPE HEADACHE: Status: ACTIVE | Noted: 2023-03-01

## 2023-03-01 PROCEDURE — 97161 PT EVAL LOW COMPLEX 20 MIN: CPT | Mod: PN

## 2023-03-01 PROCEDURE — 97110 THERAPEUTIC EXERCISES: CPT | Mod: PN

## 2023-03-01 PROCEDURE — 97140 MANUAL THERAPY 1/> REGIONS: CPT | Mod: PN

## 2023-03-01 NOTE — PROGRESS NOTES
[facility]  Physical Therapy Initial Evaluation    Name: Ashley Ayala  Clinic Number: [id number]    Therapy Diagnosis:   Encounter Diagnosis   Name Primary?    Acute intractable tension-type headache      Physician: Kary Brantley P*    Physician Orders: Physical Therapy Evaluate and Treat  Medical Diagnosis from Referral: Migraine without aura and without status migrainosus, not intractable [G43.009]  Evaluation Date: 3/1/2023  Authorization Period Expiration: 4/1/23  Plan of Care Expiration: 3/1/2023 to 6/1/23  Visit # / Visits authorized: 1/1 (pending additional authorization following initial evaluation)     Time In: 0800am  Time Out: 0900am  Total Billable Time: 60 minutes    Precautions: Standard    Subjective     Date of onset: since pt was 7 years old    History of current condition - Ashley reports: that she has had migraines since she was 7 years old. Pt states that this runs in her family and her mother also has terrible migranes. Pt reports that her neurologist wants her to try a bout of physical therapy treatment to work on her posture and strength. She states that her migraines have increased in frequency and intensity. Pt states that her migraines are first thing in the morning and they are exacerbated with sunlight, crowds, and loud noises. Pt works as a  at [employer] since December. Pt states that her migraines are not interfering with her work. Pt has migraines without aura and denies nausea. Pt denies head trauma. Pt states her migraines last all day unless she takes a pill.      Medical History:   Past Medical History:   Diagnosis Date    ADHD (attention deficit hyperactivity disorder)     Anxiety     Depression     GERD (gastroesophageal reflux disease)     Irritable bowel syndrome with constipation     Migraine        Surgical History:   Ashley Ayala  has no past surgical history on file.    Medications:   Ashley has a current medication  list which includes the following prescription(s): bupropion, bupropion, cyanocobalamin, aimovig autoinjector, fluticasone propionate, insulin syringes (disposable), lamotrigine, lamotrigine, lizandro, methylphenidate hcl, methylphenidate hcl, mirtazapine, ondansetron, topiramate, and valacyclovir.    Allergies:   Review of patient's allergies indicates:   Allergen Reactions    Tea tree Hives and Itching        Imaging: none    Prior Therapy: None  Social History: Pt lives with her boyfriend who is able to help her as needed when she has migranes.  Occupation:   Prior Level of Function: Pt having less frequent headaches of less intensity.  Current Level of Function: Pt experiences several, severe headaches.     Pain:  Current 6/10, worst 9/10, best 3/10   Location: bilateral head/neck   Description: Aching and Dull  Aggravating Factors: sunlight, crowds, noise  Easing Factors:  medication    Pts goals: Pt would like to return to living her life with no increase in headaches.     Objective     WNL=within normal limits  WFL=within functional limits  NT=not tested  *=pain    Posture: Forward head, rounded shoulders   Palpation: Pain/tenderness to R upper trapezius.  Sensation: WNL- Pt states that she has tinging in B hands intermittently that is unrelated to the headaches she is experiencing.  Deep tendon reflexes: NT          Dermatomes Right Left Comments   C1 Intact    Intact    C2 Intact Intact    C3 Intact Intact    C4 Intact Intact    C5 Intact Intact    C6 Intact Intact    C7 Intact Intact    C8 Intact Intact    T1 Intact Intact        Myotomes RUE LUE Comments   C1 5/5    5/5       C2 5/5    5/5       C3 5/5    5/5       C4 5/5    5/5       C5 5/5    5/5       C6 5/5    5/5       C7 5/5    5/5       C8 5/5    5/5       T1 5/5 5/5      Reflexes Positive Right  Positive Left Comments   Triceps 2+ 2+    Biceps 2+ 2+    Brachioradialis 2+ 2+        Active Range of Motion (AROM)/Passive Range of Motion (PROM):  "    Cervical AROM (Degrees) Comments   Flexion 60    Extension 90    Right Side Bend 40    Left Side Bend 40    Right Rotation 70 Banded tightness   Left Rotation 75        Manual Muscle Testing:       Manual Muscle Testing PRAMODE CRISTIANE Comments   Upper Trap 3/5    3/5       Middle Trap 3+/5    3/5       Lower Trap 3/5    3+/5       Rhomboids 4/5    4+/5       Serratus Anterior 3/5    3+/5       Infraspinatus 4/5    4/5       Supraspinatus 4/5    4+/5       Teres Minor 4/5    4/5                      Special Tests:    Cervical Results Comments   Distraction Negative.    Spurling's Negative.          ULTT Right  Left Comments   Median Negative. Negative.    Ulnar Negative. Negative.    Radial Negative. Negative.      CMS Impairment/Limitation/Restriction for FOTO Survey    Therapist reviewed FOTO scores for Ashley Ayala on 3/1/2023.   FOTO documents entered into [employer] - see Media section.    Limitation Score: 41%  Predicted Goal: 32%    Category: Mobility     TREATMENT     Treatment Time In: 0800am  Treatment Time Out: 0900am  Total Treatment time separate from Evaluation: 16 minutes    Therapeutic Exercises were provided for 8 minutes to improve strength and AROM including:  Chin tuck with 5" hold 30x   Standing rows vs RTB 3x10 repetitions    Manual Therapy was provided for 8 minutes for improved joint mobility including:  Suboccipital release  Lateral cervical glides Gr III/IV      Home Exercises and Patient Education Provided:    Education provided:   - Findings; prognosis and plan of care (POC)  - Home exercise program (HEP)  - Modality options  - Therapist contact information    Written Home Exercises Provided: Yes  Exercises were reviewed and Ashley was able to demonstrate them prior to the end of the session.  Ashley demonstrated good understanding of the education provided.     See EMR under Patient Instructions for exercises provided today.    Assessment     Ashley is a 22 y.o. female referred to " outpatient Physical Therapy with a medical diagnosis of Migraine without aura and without status migrainosus, not intractable [G43.009]. Pt presents to PT with pain, decreased cervical ROM, decreased strength and flexibility, poor posture, and functional deficits with headaches. These deficits are negatively impacting this patient's ability to complete their work duties and activities of daily living.     Pt prognosis is Good.   Pt will benefit from skilled outpatient Physical Therapy to address the deficits stated above and in the chart below, provide pt/family education, and to maximize pt's level of independence.     Plan of care discussed with patient: Yes  Pt's spiritual, cultural and educational needs considered and pt agreeable to plan of care and goals as stated below:     Anticipated Barriers for therapy: None    Medical Necessity is demonstrated by the following  History  Co-morbidities and personal factors that may impact the plan of care Co-morbidities:   young age    Personal Factors:   no deficits     low   Examination  Body Structures and Functions, activity limitations and participation restrictions that may impact the plan of care Body Regions:   head  neck    Body Systems:    gross symmetry  ROM    Participation Restrictions:   Walking    Activity limitations:   Learning and applying knowledge  no deficits    General Tasks and Commands  No Deficits    Communication  No Deficits    Mobility  no deficits    Self care  no deficits    Domestic Life  No Deficits    Interactions/Relationships  No Deficits    Life Areas  No Deficits    Community and Social Life  No Deficits         low   Clinical Presentation stable and uncomplicated low   Decision Making/ Complexity Score: low     GOALS:  Short Term Goals:    1.) Pt will improve their FOTO score by 5% to return to PLOF. (Progressing, not met)  2.) Pt will decrease their headache pain to 6/10 for improved QOL. (Progressing, not met)  3.) Pt will improve  their cervical extension AROM to 85 degrees for improved return to working. (Progressing, not met)  4.) Pt will improve their left middle trapezius strength to 5/5 to return to their PLOF. (Progressing, not met)  5.) Pt will become independent with their HEP to improve strength and tolerance to functional activities. (Progressing, not met)    Long Term Goals:  1.) Pt will improve their FOTO score by 10% to return to PLOF. (Progressing, not met)  2.) Pt will decrease their headache pain to 5/10 for improved QOL. (Progressing, not met)  3.) Pt will improve their right cervical rotation AROM to 80 degrees for improved return to working. (Progressing, not met)  4.) Pt will improve their right middle trapezius strength to 5/5 to return to their PLOF. (Progressing, not met)  5.) Pt will tolerate working for 8 hours with no increase in headaches to return to  PLOF. (Progressing, not met)       Plan     Plan of care Certification: 3/1/2023 to 5/1/23.    Outpatient Physical Therapy 1 times weekly for 8 weeks to include the following interventions: Therapeutic Exercises, Manual Therapeutic Technique, Neuromuscular Re Education, Therapeutic Activities. Modalities, Kinesiotape prn, and Functional Dry Needling as needed.    Deloris Petty, PT,  DPT, OCS

## 2023-03-01 NOTE — PLAN OF CARE
[facility]  Physical Therapy Initial Evaluation    Name: Ashley Ayala  Clinic Number: [id number]    Therapy Diagnosis:   Encounter Diagnosis   Name Primary?    Acute intractable tension-type headache      Physician: Kary Brantley P*    Physician Orders: Physical Therapy Evaluate and Treat  Medical Diagnosis from Referral: Migraine without aura and without status migrainosus, not intractable [G43.009]  Evaluation Date: 3/1/2023  Authorization Period Expiration: 4/1/23  Plan of Care Expiration: 3/1/2023 to 6/1/23  Visit # / Visits authorized: 1/1 (pending additional authorization following initial evaluation)     Time In: 0800am  Time Out: 0900am  Total Billable Time: 60 minutes    Precautions: Standard    Subjective     Date of onset: since pt was 7 years old    History of current condition - Ashley reports: that she has had migraines since she was 7 years old. Pt states that this runs in her family and her mother also has terrible migranes. Pt reports that her neurologist wants her to try a bout of physical therapy treatment to work on her posture and strength. She states that her migraines have increased in frequency and intensity. Pt states that her migraines are first thing in the morning and they are exacerbated with sunlight, crowds, and loud noises. Pt works as a  at [employer] since December. Pt states that her migraines are not interfering with her work. Pt has migraines without aura and denies nausea. Pt denies head trauma. Pt states her migraines last all day unless she takes a pill.      Medical History:   Past Medical History:   Diagnosis Date    ADHD (attention deficit hyperactivity disorder)     Anxiety     Depression     GERD (gastroesophageal reflux disease)     Irritable bowel syndrome with constipation     Migraine        Surgical History:   Ashley Ayala  has no past surgical history on file.    Medications:   Ashley has a current medication  list which includes the following prescription(s): bupropion, bupropion, cyanocobalamin, aimovig autoinjector, fluticasone propionate, insulin syringes (disposable), lamotrigine, lamotrigine, lizandro, methylphenidate hcl, methylphenidate hcl, mirtazapine, ondansetron, topiramate, and valacyclovir.    Allergies:   Review of patient's allergies indicates:   Allergen Reactions    Tea tree Hives and Itching        Imaging: none    Prior Therapy: None  Social History: Pt lives with her boyfriend who is able to help her as needed when she has migranes.  Occupation:   Prior Level of Function: Pt having less frequent headaches of less intensity.  Current Level of Function: Pt experiences several, severe headaches.     Pain:  Current 6/10, worst 9/10, best 3/10   Location: bilateral head/neck   Description: Aching and Dull  Aggravating Factors: sunlight, crowds, noise  Easing Factors: medication    Pts goals: Pt would like to return to living her life with no increase in headaches.     Objective     WNL=within normal limits  WFL=within functional limits  NT=not tested  *=pain    Posture: Forward head, rounded shoulders   Palpation: Pain/tenderness to R upper trapezius.  Sensation: WNL- Pt states that she has tinging in B hands intermittently that is unrelated to the headaches she is experiencing.  Deep tendon reflexes: NT          Dermatomes Right Left Comments   C1 Intact    Intact    C2 Intact Intact    C3 Intact Intact    C4 Intact Intact    C5 Intact Intact    C6 Intact Intact    C7 Intact Intact    C8 Intact Intact    T1 Intact Intact        Myotomes RUE LUE Comments   C1 5/5    5/5       C2 5/5    5/5       C3 5/5    5/5       C4 5/5    5/5       C5 5/5    5/5       C6 5/5    5/5       C7 5/5    5/5       C8 5/5    5/5       T1 5/5 5/5      Reflexes Positive Right  Positive Left Comments   Triceps 2+ 2+    Biceps 2+ 2+    Brachioradialis 2+ 2+        Active Range of Motion (AROM)/Passive Range of Motion (PROM):  "    Cervical AROM (Degrees) Comments   Flexion 60    Extension 90    Right Side Bend 40    Left Side Bend 40    Right Rotation 70 Banded tightness   Left Rotation 75        Manual Muscle Testing:       Manual Muscle Testing PRAMODE CRISTIANE Comments   Upper Trap 3/5    3/5       Middle Trap 3+/5    3/5       Lower Trap 3/5    3+/5       Rhomboids 4/5    4+/5       Serratus Anterior 3/5    3+/5       Infraspinatus 4/5    4/5       Supraspinatus 4/5    4+/5       Teres Minor 4/5    4/5                      Special Tests:    Cervical Results Comments   Distraction Negative.    Spurling's Negative.          ULTT Right  Left Comments   Median Negative. Negative.    Ulnar Negative. Negative.    Radial Negative. Negative.      CMS Impairment/Limitation/Restriction for FOTO Survey    Therapist reviewed FOTO scores for Ashley Ayala on 3/1/2023.   FOTO documents entered into [employer] - see Media section.    Limitation Score: 41%  Predicted Goal: 32%    Category: Mobility     TREATMENT     Treatment Time In: 0800am  Treatment Time Out: 0900am  Total Treatment time separate from Evaluation: 16 minutes    Therapeutic Exercises were provided for 8 minutes to improve strength and AROM including:  Chin tuck with 5" hold 30x   Standing rows vs RTB 3x10 repetitions    Manual Therapy was provided for 8 minutes for improved joint mobility including:  Suboccipital release  Lateral cervical glides Gr III/IV      Home Exercises and Patient Education Provided:    Education provided:   - Findings; prognosis and plan of care (POC)  - Home exercise program (HEP)  - Modality options  - Therapist contact information    Written Home Exercises Provided: Yes  Exercises were reviewed and Ashley was able to demonstrate them prior to the end of the session.  Ashley demonstrated good understanding of the education provided.     See EMR under Patient Instructions for exercises provided today.    Assessment     Ashley is a 22 y.o. female referred to " outpatient Physical Therapy with a medical diagnosis of Migraine without aura and without status migrainosus, not intractable [G43.009]. Pt presents to PT with pain, decreased cervical ROM, decreased strength and flexibility, poor posture, and functional deficits with headaches. These deficits are negatively impacting this patient's ability to complete their work duties and activities of daily living.     Pt prognosis is Good.   Pt will benefit from skilled outpatient Physical Therapy to address the deficits stated above and in the chart below, provide pt/family education, and to maximize pt's level of independence.     Plan of care discussed with patient: Yes  Pt's spiritual, cultural and educational needs considered and pt agreeable to plan of care and goals as stated below:     Anticipated Barriers for therapy: None    Medical Necessity is demonstrated by the following  History  Co-morbidities and personal factors that may impact the plan of care Co-morbidities:   young age    Personal Factors:   no deficits     low   Examination  Body Structures and Functions, activity limitations and participation restrictions that may impact the plan of care Body Regions:   head  neck    Body Systems:    gross symmetry  ROM    Participation Restrictions:   Walking    Activity limitations:   Learning and applying knowledge  no deficits    General Tasks and Commands  No Deficits    Communication  No Deficits    Mobility  no deficits    Self care  no deficits    Domestic Life  No Deficits    Interactions/Relationships  No Deficits    Life Areas  No Deficits    Community and Social Life  No Deficits         low   Clinical Presentation stable and uncomplicated low   Decision Making/ Complexity Score: low     GOALS:  Short Term Goals:    1.) Pt will improve their FOTO score by 5% to return to PLOF. (Progressing, not met)  2.) Pt will decrease their headache pain to 6/10 for improved QOL. (Progressing, not met)  3.) Pt will improve  their cervical extension AROM to 85 degrees for improved return to working. (Progressing, not met)  4.) Pt will improve their left middle trapezius strength to 5/5 to return to their PLOF. (Progressing, not met)  5.) Pt will become independent with their HEP to improve strength and tolerance to functional activities. (Progressing, not met)    Long Term Goals:  1.) Pt will improve their FOTO score by 10% to return to PLOF. (Progressing, not met)  2.) Pt will decrease their headache pain to 5/10 for improved QOL. (Progressing, not met)  3.) Pt will improve their right cervical rotation AROM to 80 degrees for improved return to working. (Progressing, not met)  4.) Pt will improve their right middle trapezius strength to 5/5 to return to their PLOF. (Progressing, not met)  5.) Pt will tolerate working for 8 hours with no increase in headaches to return to  PLOF. (Progressing, not met)       Plan     Plan of care Certification: 3/1/2023 to 5/1/23.    Outpatient Physical Therapy 1 times weekly for 8 weeks to include the following interventions: Therapeutic Exercises, Manual Therapeutic Technique, Neuromuscular Re Education, Therapeutic Activities. Modalities, Kinesiotape prn, and Functional Dry Needling as needed.    Deloris Petty, PT,  DPT, OCS

## 2023-03-08 ENCOUNTER — OFFICE VISIT (OUTPATIENT)
Dept: PSYCHIATRY | Facility: CLINIC | Age: 23
End: 2023-03-08
Payer: COMMERCIAL

## 2023-03-08 VITALS
DIASTOLIC BLOOD PRESSURE: 52 MMHG | HEART RATE: 80 BPM | BODY MASS INDEX: 20.19 KG/M2 | SYSTOLIC BLOOD PRESSURE: 85 MMHG | WEIGHT: 100 LBS

## 2023-03-08 DIAGNOSIS — F32.9 MAJOR DEPRESSION, CHRONIC: ICD-10-CM

## 2023-03-08 DIAGNOSIS — F90.0 ATTENTION DEFICIT HYPERACTIVITY DISORDER (ADHD), PREDOMINANTLY INATTENTIVE TYPE: Primary | ICD-10-CM

## 2023-03-08 PROCEDURE — 99999 PR PBB SHADOW E&M-EST. PATIENT-LVL II: CPT | Mod: PBBFAC,,, | Performed by: STUDENT IN AN ORGANIZED HEALTH CARE EDUCATION/TRAINING PROGRAM

## 2023-03-08 PROCEDURE — 3008F BODY MASS INDEX DOCD: CPT | Mod: CPTII,S$GLB,, | Performed by: STUDENT IN AN ORGANIZED HEALTH CARE EDUCATION/TRAINING PROGRAM

## 2023-03-08 PROCEDURE — 3008F PR BODY MASS INDEX (BMI) DOCUMENTED: ICD-10-PCS | Mod: CPTII,S$GLB,, | Performed by: STUDENT IN AN ORGANIZED HEALTH CARE EDUCATION/TRAINING PROGRAM

## 2023-03-08 PROCEDURE — 3078F DIAST BP <80 MM HG: CPT | Mod: CPTII,S$GLB,, | Performed by: STUDENT IN AN ORGANIZED HEALTH CARE EDUCATION/TRAINING PROGRAM

## 2023-03-08 PROCEDURE — 99214 OFFICE O/P EST MOD 30 MIN: CPT | Mod: S$GLB,,, | Performed by: STUDENT IN AN ORGANIZED HEALTH CARE EDUCATION/TRAINING PROGRAM

## 2023-03-08 PROCEDURE — 99214 PR OFFICE/OUTPT VISIT, EST, LEVL IV, 30-39 MIN: ICD-10-PCS | Mod: S$GLB,,, | Performed by: STUDENT IN AN ORGANIZED HEALTH CARE EDUCATION/TRAINING PROGRAM

## 2023-03-08 PROCEDURE — 99999 PR PBB SHADOW E&M-EST. PATIENT-LVL II: ICD-10-PCS | Mod: PBBFAC,,, | Performed by: STUDENT IN AN ORGANIZED HEALTH CARE EDUCATION/TRAINING PROGRAM

## 2023-03-08 PROCEDURE — 3078F PR MOST RECENT DIASTOLIC BLOOD PRESSURE < 80 MM HG: ICD-10-PCS | Mod: CPTII,S$GLB,, | Performed by: STUDENT IN AN ORGANIZED HEALTH CARE EDUCATION/TRAINING PROGRAM

## 2023-03-08 PROCEDURE — 3074F PR MOST RECENT SYSTOLIC BLOOD PRESSURE < 130 MM HG: ICD-10-PCS | Mod: CPTII,S$GLB,, | Performed by: STUDENT IN AN ORGANIZED HEALTH CARE EDUCATION/TRAINING PROGRAM

## 2023-03-08 PROCEDURE — 3074F SYST BP LT 130 MM HG: CPT | Mod: CPTII,S$GLB,, | Performed by: STUDENT IN AN ORGANIZED HEALTH CARE EDUCATION/TRAINING PROGRAM

## 2023-03-08 RX ORDER — DEXMETHYLPHENIDATE HYDROCHLORIDE 15 MG/1
15 CAPSULE, EXTENDED RELEASE ORAL DAILY
Qty: 30 CAPSULE | Refills: 0 | Status: SHIPPED | OUTPATIENT
Start: 2023-03-08 | End: 2023-04-12 | Stop reason: SDUPTHER

## 2023-03-08 RX ORDER — MIRTAZAPINE 45 MG/1
45 TABLET, FILM COATED ORAL NIGHTLY
Qty: 30 TABLET | Refills: 3 | Status: SHIPPED | OUTPATIENT
Start: 2023-03-08 | End: 2023-04-12 | Stop reason: SDUPTHER
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
## 2023-03-08 NOTE — PROGRESS NOTES
[facility]   Physical Therapy Treatment Note     Name: Ashley Ayala  Clinic Number: [id number]    Therapy Diagnosis:   Encounter Diagnosis   Name Primary?    Acute intractable tension-type headache Yes     Physician: Kary Brantley P*    Visit Date: 3/9/2023    Physician Orders: Physical Therapy Evaluate and Treat  Medical Diagnosis from Referral: Migraine without aura and without status migrainosus, not intractable [G43.009]  Evaluation Date: 3/1/2023  Authorization Period Expiration: 4/1/23  Plan of Care Expiration: 3/1/2023 to 6/1/23  Visit # / Visits authorized: 2/12    Precautions: Standard    Time In: 1055  Time Out: 1200  Total Billable Time: 58 minutes    SUBJECTIVE     She was compliant with home exercise program.  Response to previous treatment: She had headache yesterday. States she feels like light and or high pitched sounds cause an increase in her migraines. She has not been performing her row exercises due to not having a doorknob/door that will work well.     Functional change: n/a    Prior Level of Function: Pt having less frequent headaches of less intensity.  Current Level of Function: Pt experiences several, severe headaches.      Pain:  Current 0/10, worst 9/10, best 3/10   Location: bilateral head/neck   Description: Aching and Dull  Aggravating Factors: sunlight, crowds, noise  Easing Factors: medication     Pts goals: Pt would like to return to living her life with no increase in headaches.    OBJECTIVE     Objective Measures updated at progress report unless specified.     Treatment     Ashley received the treatments listed below:      Therapeutic Exercises were provided for 12 minutes to improve strength and AROM including:  Standing rows vs RTB 3x10 repetitions  +Scapular retractions, 20x          Manual Therapy was provided for 00 minutes for improved joint mobility including:  Suboccipital release  +MFR for cervical paraspinals B UT  Lateral  "cervical glides Gr III/IV    neuromuscular re-education activities to improve: Kinesthetic, Sense, Proprioception, and Posture for 23 minutes. The following activities were included:  Chin tuck with 5" hold 30x   +Prone chin tucks + cervical L/R nod  +Slouch corrections, YTB, 30x  +Seated thoracic extension stretch over 1/2 foam, 10x10"      therapeutic activities to improve functional performance for 23 minutes, including:    +Walking pull aparts RTB, 80ftx2  +Field goals, RTB 2x10  +Standing shoulder flexion at wall with 1/2 foam for posture 1#, 2x10  +Serratus wall slides, YTB with black bolster at wall, 20x            Patient Education and Home Exercises     Home Exercises Provided and Patient Education Provided     Education provided:   - posture    Written Home Exercises Provided: Patient instructed to cont prior HEP. Exercises were reviewed and Ashley was able to demonstrate them prior to the end of the session.  Ashley demonstrated good  understanding of the education provided. See EMR under Patient Instructions for exercises provided during therapy sessions    ASSESSMENT      Pt presents with forward head and rounded shoulders in sitting/standing postures. Muscle weakness with cervical paraspinals most notably with splenius cervicis/splenius captious during ther-ex. Emphasis was focused on posture re-education and scapular strength. Despite weakness pt demonstrated good endurance visit. Pt was able to perform improved axial extension/cervical retraction during chin tucks after receive cuing.       Ashley Is progressing well towards her goals.   Pt prognosis is Good.     Pt will continue to benefit from skilled outpatient physical therapy to address the deficits listed in the problem list box on initial evaluation, provide pt/family education and to maximize pt's level of independence in the home and community environment.     Pt's spiritual, cultural and educational needs considered and pt agreeable " to plan of care and goals.     Anticipated Barriers for therapy: None    GOALS:  Short Term Goals:     1.) Pt will improve their FOTO score by 5% to return to PLOF. (Progressing, not met)  2.) Pt will decrease their headache pain to 6/10 for improved QOL. (Progressing, not met)  3.) Pt will improve their cervical extension AROM to 85 degrees for improved return to working. (Progressing, not met)  4.) Pt will improve their left middle trapezius strength to 5/5 to return to their PLOF. (Progressing, not met)  5.) Pt will become independent with their HEP to improve strength and tolerance to functional activities. (Progressing, not met)     Long Term Goals:  1.) Pt will improve their FOTO score by 10% to return to PLOF. (Progressing, not met)  2.) Pt will decrease their headache pain to 5/10 for improved QOL. (Progressing, not met)  3.) Pt will improve their right cervical rotation AROM to 80 degrees for improved return to working. (Progressing, not met)  4.) Pt will improve their right middle trapezius strength to 5/5 to return to their PLOF. (Progressing, not met)  5.) Pt will tolerate working for 8 hours with no increase in headaches to return to  PLOF. (Progressing, not met)     PLAN     Plan of care Certification: 3/1/2023 to 5/1/23.    Focus on cervical strength/ROM and posture re-education with emphasis on ADL performance      Outpatient Physical Therapy 1 times weekly for 8 weeks to include the following interventions: Therapeutic Exercises, Manual Therapeutic Technique, Neuromuscular Re Education, Therapeutic Activities. Modalities, Kinesiotape prn, and Functional Dry Needling as needed.    Angelito Liriano, PTA

## 2023-03-09 ENCOUNTER — CLINICAL SUPPORT (OUTPATIENT)
Dept: REHABILITATION | Facility: OTHER | Age: 23
End: 2023-03-09
Payer: COMMERCIAL

## 2023-03-09 DIAGNOSIS — G44.201 ACUTE INTRACTABLE TENSION-TYPE HEADACHE: Primary | ICD-10-CM

## 2023-03-09 PROCEDURE — 97110 THERAPEUTIC EXERCISES: CPT | Mod: PN,CQ

## 2023-03-09 PROCEDURE — 97530 THERAPEUTIC ACTIVITIES: CPT | Mod: PN,CQ

## 2023-03-09 PROCEDURE — 97112 NEUROMUSCULAR REEDUCATION: CPT | Mod: PN,CQ

## 2023-03-15 ENCOUNTER — CLINICAL SUPPORT (OUTPATIENT)
Dept: REHABILITATION | Facility: OTHER | Age: 23
End: 2023-03-15
Payer: COMMERCIAL

## 2023-03-15 DIAGNOSIS — G44.201 ACUTE INTRACTABLE TENSION-TYPE HEADACHE: Primary | ICD-10-CM

## 2023-03-15 PROCEDURE — 97530 THERAPEUTIC ACTIVITIES: CPT | Mod: PN

## 2023-03-15 PROCEDURE — 97112 NEUROMUSCULAR REEDUCATION: CPT | Mod: PN

## 2023-03-15 PROCEDURE — 97140 MANUAL THERAPY 1/> REGIONS: CPT | Mod: PN

## 2023-03-15 PROCEDURE — 97110 THERAPEUTIC EXERCISES: CPT | Mod: PN

## 2023-03-15 NOTE — PROGRESS NOTES
[facility]   Physical Therapy Treatment Note     Name: Ashley Ayala  Clinic Number: [id number]    Therapy Diagnosis:   Encounter Diagnosis   Name Primary?    Acute intractable tension-type headache Yes       Physician: Kary Brantley P*    Visit Date: 3/15/2023    Physician Orders: Physical Therapy Evaluate and Treat  Medical Diagnosis from Referral: Migraine without aura and without status migrainosus, not intractable [G43.009]  Evaluation Date: 3/1/2023  Authorization Period Expiration: 4/1/23  Plan of Care Expiration: 3/1/2023 to 6/1/23  Visit # / Visits authorized: 2/12    Precautions: Standard    Time In: 1055  Time Out: 1200  Total Billable Time: 58 minutes    SUBJECTIVE   Pt reports that she is doing great today. Pt states that her migraines are less frequent and she is now having improved ability to work.      She was compliant with home exercise program.  Response to previous treatment: She had headache yesterday. States she feels like light and or high pitched sounds cause an increase in her migraines. She has not been performing her row exercises due to not having a doorknob/door that will work well.     Functional change: n/a    Prior Level of Function: Pt having less frequent headaches of less intensity.  Current Level of Function: Pt experiences several, severe headaches.      Pain:  Current 0/10, worst 9/10, best 3/10   Location: bilateral head/neck   Description: Aching and Dull  Aggravating Factors: sunlight, crowds, noise  Easing Factors: medication     Pts goals: Pt would like to return to living her life with no increase in headaches.    OBJECTIVE     Objective Measures updated at progress report unless specified.     Treatment     Ashley received the treatments listed below:      Therapeutic Exercises were provided for 12 minutes to improve strength and AROM including:  Standing rows vs RTB 3x10 repetitions  Scapular retractions, 20x      Manual Therapy  "was provided for 8 minutes for improved joint mobility including:  Suboccipital release  MFR for cervical paraspinals B UTn  Lateral cervical glides Gr III/IV    neuromuscular re-education activities to improve: Kinesthetic, Sense, Proprioception, and Posture for 23 minutes. The following activities were included:  Chin tuck with 5" hold 30x   Prone chin tucks + cervical L/R nod  Slouch corrections, YTB, 30x  Seated thoracic extension stretch over 1/2 foam, 10x10"      therapeutic activities to improve functional performance for 23 minutes, including:  Walking pull aparts RTB, 80ftx2  Field goals, RTB 2x10  Standing shoulder flexion at wall with 1/2 foam for posture 1#, 2x10  Serratus wall slides, YTB with black bolster at wall, 20x      Patient Education and Home Exercises     Home Exercises Provided and Patient Education Provided     Education provided:   - posture    Written Home Exercises Provided: Patient instructed to cont prior HEP. Exercises were reviewed and Ashley was able to demonstrate them prior to the end of the session.  Ashley demonstrated good  understanding of the education provided. See EMR under Patient Instructions for exercises provided during therapy sessions    ASSESSMENT   Pt tolerated tx session well today with infrequent rest breaks. Pt tolerated additional resistance with postural strengthening exercises. Continue PT POC with emphasis on improving pt's headache frequency and intensity.      Ashley Is progressing well towards her goals.   Pt prognosis is Good.     Pt will continue to benefit from skilled outpatient physical therapy to address the deficits listed in the problem list box on initial evaluation, provide pt/family education and to maximize pt's level of independence in the home and community environment.     Pt's spiritual, cultural and educational needs considered and pt agreeable to plan of care and goals.     Anticipated Barriers for therapy: None    GOALS:  Short Term " Goals:     1.) Pt will improve their FOTO score by 5% to return to PLOF. (Progressing, not met)  2.) Pt will decrease their headache pain to 6/10 for improved QOL. (Progressing, not met)  3.) Pt will improve their cervical extension AROM to 85 degrees for improved return to working. (Progressing, not met)  4.) Pt will improve their left middle trapezius strength to 5/5 to return to their PLOF. (Progressing, not met)  5.) Pt will become independent with their HEP to improve strength and tolerance to functional activities. (Progressing, not met)     Long Term Goals:  1.) Pt will improve their FOTO score by 10% to return to PLOF. (Progressing, not met)  2.) Pt will decrease their headache pain to 5/10 for improved QOL. (Progressing, not met)  3.) Pt will improve their right cervical rotation AROM to 80 degrees for improved return to working. (Progressing, not met)  4.) Pt will improve their right middle trapezius strength to 5/5 to return to their PLOF. (Progressing, not met)  5.) Pt will tolerate working for 8 hours with no increase in headaches to return to  PLOF. (Progressing, not met)     PLAN     Plan of care Certification: 3/1/2023 to 5/1/23.    Focus on cervical strength/ROM and posture re-education with emphasis on ADL performance      Outpatient Physical Therapy 1 times weekly for 8 weeks to include the following interventions: Therapeutic Exercises, Manual Therapeutic Technique, Neuromuscular Re Education, Therapeutic Activities. Modalities, Kinesiotape prn, and Functional Dry Needling as needed.    Deloris Petty, PT

## 2023-03-22 ENCOUNTER — CLINICAL SUPPORT (OUTPATIENT)
Dept: REHABILITATION | Facility: OTHER | Age: 23
End: 2023-03-22
Payer: COMMERCIAL

## 2023-03-22 ENCOUNTER — PATIENT MESSAGE (OUTPATIENT)
Dept: NEUROLOGY | Facility: CLINIC | Age: 23
End: 2023-03-22
Payer: COMMERCIAL

## 2023-03-22 DIAGNOSIS — G44.201 ACUTE INTRACTABLE TENSION-TYPE HEADACHE: Primary | ICD-10-CM

## 2023-03-22 PROCEDURE — 97110 THERAPEUTIC EXERCISES: CPT | Mod: PN

## 2023-03-22 PROCEDURE — 97530 THERAPEUTIC ACTIVITIES: CPT | Mod: PN

## 2023-03-22 PROCEDURE — 97112 NEUROMUSCULAR REEDUCATION: CPT | Mod: PN

## 2023-03-22 PROCEDURE — 97140 MANUAL THERAPY 1/> REGIONS: CPT | Mod: PN

## 2023-03-22 NOTE — PROGRESS NOTES
[facility]   Physical Therapy Treatment Note     Name: Ashley Ayala  Clinic Number: [id number]    Therapy Diagnosis:   Encounter Diagnosis   Name Primary?    Acute intractable tension-type headache Yes       Physician: Kary Brantley P*    Visit Date: 3/22/2023    Physician Orders: Physical Therapy Evaluate and Treat  Medical Diagnosis from Referral: Migraine without aura and without status migrainosus, not intractable [G43.009]  Evaluation Date: 3/1/2023  Authorization Period Expiration: 4/1/23  Plan of Care Expiration: 3/1/2023 to 6/1/23  Visit # / Visits authorized: 4/12    Precautions: Standard    Time In: 0100pm  Time Out: 0200pm  Total Billable Time: 60 minutes    SUBJECTIVE   Pt reports that she is doing fairly today. Pt states that she is experiencing a slight migraine today.       She was compliant with home exercise program.  Response to previous treatment: She had headache yesterday. States she feels like light and or high pitched sounds cause an increase in her migraines. She has not been performing her row exercises due to not having a doorknob/door that will work well.     Functional change: n/a    Prior Level of Function: Pt having less frequent headaches of less intensity.  Current Level of Function: Pt experiences several, severe headaches.      Pain:  Current 0/10, worst 9/10, best 3/10   Location: bilateral head/neck   Description: Aching and Dull  Aggravating Factors: sunlight, crowds, noise  Easing Factors: medication     Pts goals: Pt would like to return to living her life with no increase in headaches.    OBJECTIVE     Objective Measures updated at progress report unless specified.     Treatment     Ashley received the treatments listed below:      Therapeutic Exercises were provided for 8 minutes to improve strength and AROM including:  Standing rows vs RTB 3x10 repetitions  Scapular retractions, 20x      Manual Therapy was provided for 8 minutes for  "improved joint mobility including:  Suboccipital release  MFR for cervical paraspinals B UTn  Lateral cervical glides Gr III/IV    neuromuscular re-education activities to improve: Kinesthetic, Sense, Proprioception, and Posture for 30 minutes. The following activities were included:  Chin tuck with 5" hold 30x   Prone chin tucks + cervical L/R nod  Slouch corrections, YTB, 30x  Seated thoracic extension stretch over 1/2 foam, 10x10"      therapeutic activities to improve functional performance for 8 minutes, including:  Walking pull aparts RTB, 80ftx2  Field goals, RTB 2x10  Standing shoulder flexion at wall with 1/2 foam for posture 1#, 2x10  Serratus wall slides, YTB with black bolster at wall, 20x      Patient Education and Home Exercises     Home Exercises Provided and Patient Education Provided     Education provided:   - posture    Written Home Exercises Provided: Patient instructed to cont prior HEP. Exercises were reviewed and Ashley was able to demonstrate them prior to the end of the session.  Ashley demonstrated good  understanding of the education provided. See EMR under Patient Instructions for exercises provided during therapy sessions    ASSESSMENT   Pt tolerated tx session well today with infrequent rest breaks. Pt tolerated additional resistance with postural strengthening exercises. Continue PT POC with emphasis on improving pt's headache frequency and intensity.      Ashley Is progressing well towards her goals.   Pt prognosis is Good.     Pt will continue to benefit from skilled outpatient physical therapy to address the deficits listed in the problem list box on initial evaluation, provide pt/family education and to maximize pt's level of independence in the home and community environment.     Pt's spiritual, cultural and educational needs considered and pt agreeable to plan of care and goals.     Anticipated Barriers for therapy: None    GOALS:  Short Term Goals:     1.) Pt will improve " their FOTO score by 5% to return to PLOF. (Progressing, not met)  2.) Pt will decrease their headache pain to 6/10 for improved QOL. (Progressing, not met)  3.) Pt will improve their cervical extension AROM to 85 degrees for improved return to working. (Progressing, not met)  4.) Pt will improve their left middle trapezius strength to 5/5 to return to their PLOF. (Progressing, not met)  5.) Pt will become independent with their HEP to improve strength and tolerance to functional activities. (Progressing, not met)     Long Term Goals:  1.) Pt will improve their FOTO score by 10% to return to PLOF. (Progressing, not met)  2.) Pt will decrease their headache pain to 5/10 for improved QOL. (Progressing, not met)  3.) Pt will improve their right cervical rotation AROM to 80 degrees for improved return to working. (Progressing, not met)  4.) Pt will improve their right middle trapezius strength to 5/5 to return to their PLOF. (Progressing, not met)  5.) Pt will tolerate working for 8 hours with no increase in headaches to return to  PLOF. (Progressing, not met)     PLAN     Plan of care Certification: 3/1/2023 to 5/1/23.    Focus on cervical strength/ROM and posture re-education with emphasis on ADL performance      Outpatient Physical Therapy 1 times weekly for 8 weeks to include the following interventions: Therapeutic Exercises, Manual Therapeutic Technique, Neuromuscular Re Education, Therapeutic Activities. Modalities, Kinesiotape prn, and Functional Dry Needling as needed.    Deloris Petty, PT

## 2023-03-29 ENCOUNTER — CLINICAL SUPPORT (OUTPATIENT)
Dept: REHABILITATION | Facility: OTHER | Age: 23
End: 2023-03-29
Payer: COMMERCIAL

## 2023-03-29 DIAGNOSIS — G44.201 ACUTE INTRACTABLE TENSION-TYPE HEADACHE: Primary | ICD-10-CM

## 2023-03-29 DIAGNOSIS — G43.009 MIGRAINE WITHOUT AURA AND WITHOUT STATUS MIGRAINOSUS, NOT INTRACTABLE: Primary | ICD-10-CM

## 2023-03-29 PROCEDURE — 97140 MANUAL THERAPY 1/> REGIONS: CPT | Mod: PN

## 2023-03-29 PROCEDURE — 97110 THERAPEUTIC EXERCISES: CPT | Mod: PN

## 2023-03-29 PROCEDURE — 97112 NEUROMUSCULAR REEDUCATION: CPT | Mod: PN

## 2023-03-29 PROCEDURE — 97530 THERAPEUTIC ACTIVITIES: CPT | Mod: PN

## 2023-03-29 RX ORDER — UBROGEPANT 50 MG/1
50 TABLET ORAL ONCE AS NEEDED
Qty: 10 TABLET | Refills: 11 | Status: SHIPPED | OUTPATIENT
Start: 2023-03-29 | End: 2023-03-29

## 2023-03-29 NOTE — PROGRESS NOTES
[facility]   Physical Therapy Treatment Note     Name: Ashley Ayala  Clinic Number: [id number]    Therapy Diagnosis:   Encounter Diagnosis   Name Primary?    Acute intractable tension-type headache Yes       Physician: Kary Brantley P*    Visit Date: 3/29/2023    Physician Orders: Physical Therapy Evaluate and Treat  Medical Diagnosis from Referral: Migraine without aura and without status migrainosus, not intractable [G43.009]  Evaluation Date: 3/1/2023  Authorization Period Expiration: 4/1/23  Plan of Care Expiration: 3/1/2023 to 6/1/23  Visit # / Visits authorized: 4/12    Precautions: Standard    Time In: 0100pm  Time Out: 0200pm  Total Billable Time: 60 minutes    SUBJECTIVE   Pt reports that she is doing fairly today. Pt states that she is experiencing a slight migraine today.       She was compliant with home exercise program.  Response to previous treatment: She had headache yesterday. States she feels like light and or high pitched sounds cause an increase in her migraines. She has not been performing her row exercises due to not having a doorknob/door that will work well.     Functional change: n/a    Prior Level of Function: Pt having less frequent headaches of less intensity.  Current Level of Function: Pt experiences several, severe headaches.      Pain:  Current 0/10, worst 9/10, best 3/10   Location: bilateral head/neck   Description: Aching and Dull  Aggravating Factors: sunlight, crowds, noise  Easing Factors: medication     Pts goals: Pt would like to return to living her life with no increase in headaches.    OBJECTIVE     Objective Measures updated at progress report unless specified.     Treatment     Ashley received the treatments listed below:      Therapeutic Exercises were provided for 8 minutes to improve strength and AROM including:  Standing rows vs RTB 3x10 repetitions  Scapular retractions, 20x      Manual Therapy was provided for 8 minutes for  "improved joint mobility including:  Suboccipital release  MFR for cervical paraspinals B UTn  Lateral cervical glides Gr III/IV    neuromuscular re-education activities to improve: Kinesthetic, Sense, Proprioception, and Posture for 30 minutes. The following activities were included:  Chin tuck with 5" hold 30x   Prone chin tucks + cervical L/R nod  Slouch corrections, YTB, 30x  Seated thoracic extension stretch over 1/2 foam, 10x10"      therapeutic activities to improve functional performance for 8 minutes, including:  Walking pull aparts RTB, 80ftx2  Field goals, RTB 2x10  Standing shoulder flexion at wall with 1/2 foam for posture 1#, 2x10  Serratus wall slides, YTB with black bolster at wall, 20x      Patient Education and Home Exercises     Home Exercises Provided and Patient Education Provided     Education provided:   - posture    Written Home Exercises Provided: Patient instructed to cont prior HEP. Exercises were reviewed and Ashley was able to demonstrate them prior to the end of the session.  Ashley demonstrated good  understanding of the education provided. See EMR under Patient Instructions for exercises provided during therapy sessions    ASSESSMENT   Pt tolerated tx session well today with infrequent rest breaks. Pt tolerated additional resistance with postural strengthening exercises. Continue PT POC with emphasis on improving pt's headache frequency and intensity.      Ashley Is progressing well towards her goals.   Pt prognosis is Good.     Pt will continue to benefit from skilled outpatient physical therapy to address the deficits listed in the problem list box on initial evaluation, provide pt/family education and to maximize pt's level of independence in the home and community environment.     Pt's spiritual, cultural and educational needs considered and pt agreeable to plan of care and goals.     Anticipated Barriers for therapy: None    GOALS:  Short Term Goals:     1.) Pt will improve " their FOTO score by 5% to return to PLOF. (Progressing, not met)  2.) Pt will decrease their headache pain to 6/10 for improved QOL. (Progressing, not met)  3.) Pt will improve their cervical extension AROM to 85 degrees for improved return to working. (Progressing, not met)  4.) Pt will improve their left middle trapezius strength to 5/5 to return to their PLOF. (Progressing, not met)  5.) Pt will become independent with their HEP to improve strength and tolerance to functional activities. (Progressing, not met)     Long Term Goals:  1.) Pt will improve their FOTO score by 10% to return to PLOF. (Progressing, not met)  2.) Pt will decrease their headache pain to 5/10 for improved QOL. (Progressing, not met)  3.) Pt will improve their right cervical rotation AROM to 80 degrees for improved return to working. (Progressing, not met)  4.) Pt will improve their right middle trapezius strength to 5/5 to return to their PLOF. (Progressing, not met)  5.) Pt will tolerate working for 8 hours with no increase in headaches to return to  PLOF. (Progressing, not met)     PLAN     Plan of care Certification: 3/1/2023 to 5/1/23.    Focus on cervical strength/ROM and posture re-education with emphasis on ADL performance      Outpatient Physical Therapy 1 times weekly for 8 weeks to include the following interventions: Therapeutic Exercises, Manual Therapeutic Technique, Neuromuscular Re Education, Therapeutic Activities. Modalities, Kinesiotape prn, and Functional Dry Needling as needed.    Deloris Petty, PT

## 2023-04-08 ENCOUNTER — PATIENT MESSAGE (OUTPATIENT)
Dept: NEUROLOGY | Facility: CLINIC | Age: 23
End: 2023-04-08
Payer: COMMERCIAL

## 2023-04-08 DIAGNOSIS — R79.89 LOW VITAMIN B12 LEVEL: Primary | ICD-10-CM

## 2023-04-10 ENCOUNTER — PATIENT MESSAGE (OUTPATIENT)
Dept: NEUROLOGY | Facility: CLINIC | Age: 23
End: 2023-04-10
Payer: COMMERCIAL

## 2023-04-12 ENCOUNTER — LAB VISIT (OUTPATIENT)
Dept: LAB | Facility: HOSPITAL | Age: 23
End: 2023-04-12
Payer: COMMERCIAL

## 2023-04-12 ENCOUNTER — OFFICE VISIT (OUTPATIENT)
Dept: PSYCHIATRY | Facility: CLINIC | Age: 23
End: 2023-04-12
Payer: COMMERCIAL

## 2023-04-12 VITALS
HEART RATE: 92 BPM | WEIGHT: 100.63 LBS | BODY MASS INDEX: 20.33 KG/M2 | DIASTOLIC BLOOD PRESSURE: 58 MMHG | SYSTOLIC BLOOD PRESSURE: 94 MMHG

## 2023-04-12 DIAGNOSIS — F41.1 GAD (GENERALIZED ANXIETY DISORDER): ICD-10-CM

## 2023-04-12 DIAGNOSIS — R79.89 LOW VITAMIN B12 LEVEL: ICD-10-CM

## 2023-04-12 DIAGNOSIS — F90.0 ATTENTION DEFICIT HYPERACTIVITY DISORDER (ADHD), PREDOMINANTLY INATTENTIVE TYPE: Primary | ICD-10-CM

## 2023-04-12 DIAGNOSIS — F32.9 MAJOR DEPRESSION, CHRONIC: ICD-10-CM

## 2023-04-12 PROCEDURE — 83921 ORGANIC ACID SINGLE QUANT: CPT | Performed by: PHYSICIAN ASSISTANT

## 2023-04-12 PROCEDURE — 99999 PR PBB SHADOW E&M-EST. PATIENT-LVL II: ICD-10-PCS | Mod: PBBFAC,,, | Performed by: STUDENT IN AN ORGANIZED HEALTH CARE EDUCATION/TRAINING PROGRAM

## 2023-04-12 PROCEDURE — 82607 VITAMIN B-12: CPT | Performed by: PHYSICIAN ASSISTANT

## 2023-04-12 PROCEDURE — 3008F PR BODY MASS INDEX (BMI) DOCUMENTED: ICD-10-PCS | Mod: CPTII,S$GLB,, | Performed by: STUDENT IN AN ORGANIZED HEALTH CARE EDUCATION/TRAINING PROGRAM

## 2023-04-12 PROCEDURE — 36415 COLL VENOUS BLD VENIPUNCTURE: CPT | Performed by: PHYSICIAN ASSISTANT

## 2023-04-12 PROCEDURE — 3074F SYST BP LT 130 MM HG: CPT | Mod: CPTII,S$GLB,, | Performed by: STUDENT IN AN ORGANIZED HEALTH CARE EDUCATION/TRAINING PROGRAM

## 2023-04-12 PROCEDURE — 99214 OFFICE O/P EST MOD 30 MIN: CPT | Mod: S$GLB,,, | Performed by: STUDENT IN AN ORGANIZED HEALTH CARE EDUCATION/TRAINING PROGRAM

## 2023-04-12 PROCEDURE — 99999 PR PBB SHADOW E&M-EST. PATIENT-LVL II: CPT | Mod: PBBFAC,,, | Performed by: STUDENT IN AN ORGANIZED HEALTH CARE EDUCATION/TRAINING PROGRAM

## 2023-04-12 PROCEDURE — 3078F PR MOST RECENT DIASTOLIC BLOOD PRESSURE < 80 MM HG: ICD-10-PCS | Mod: CPTII,S$GLB,, | Performed by: STUDENT IN AN ORGANIZED HEALTH CARE EDUCATION/TRAINING PROGRAM

## 2023-04-12 PROCEDURE — 3074F PR MOST RECENT SYSTOLIC BLOOD PRESSURE < 130 MM HG: ICD-10-PCS | Mod: CPTII,S$GLB,, | Performed by: STUDENT IN AN ORGANIZED HEALTH CARE EDUCATION/TRAINING PROGRAM

## 2023-04-12 PROCEDURE — 3078F DIAST BP <80 MM HG: CPT | Mod: CPTII,S$GLB,, | Performed by: STUDENT IN AN ORGANIZED HEALTH CARE EDUCATION/TRAINING PROGRAM

## 2023-04-12 PROCEDURE — 99214 PR OFFICE/OUTPT VISIT, EST, LEVL IV, 30-39 MIN: ICD-10-PCS | Mod: S$GLB,,, | Performed by: STUDENT IN AN ORGANIZED HEALTH CARE EDUCATION/TRAINING PROGRAM

## 2023-04-12 PROCEDURE — 3008F BODY MASS INDEX DOCD: CPT | Mod: CPTII,S$GLB,, | Performed by: STUDENT IN AN ORGANIZED HEALTH CARE EDUCATION/TRAINING PROGRAM

## 2023-04-12 RX ORDER — BUPROPION HYDROCHLORIDE 300 MG/1
300 TABLET ORAL DAILY
Qty: 30 TABLET | Refills: 5 | Status: SHIPPED | OUTPATIENT
Start: 2023-04-12

## 2023-04-12 RX ORDER — BUPROPION HYDROCHLORIDE 150 MG/1
150 TABLET ORAL DAILY
Qty: 30 TABLET | Refills: 5 | Status: SHIPPED | OUTPATIENT
Start: 2023-04-12

## 2023-04-12 RX ORDER — LAMOTRIGINE 25 MG/1
50 TABLET ORAL DAILY
Qty: 60 TABLET | Refills: 5 | Status: SHIPPED | OUTPATIENT
Start: 2023-04-12 | End: 2024-04-11

## 2023-04-12 RX ORDER — DEXMETHYLPHENIDATE HYDROCHLORIDE 15 MG/1
15 CAPSULE, EXTENDED RELEASE ORAL DAILY
Qty: 30 CAPSULE | Refills: 0 | Status: SHIPPED | OUTPATIENT
Start: 2023-04-12 | End: 2023-05-12

## 2023-04-12 RX ORDER — DEXMETHYLPHENIDATE HYDROCHLORIDE 15 MG/1
15 CAPSULE, EXTENDED RELEASE ORAL DAILY
Qty: 30 CAPSULE | Refills: 0 | Status: SHIPPED | OUTPATIENT
Start: 2023-05-10 | End: 2023-06-09

## 2023-04-12 RX ORDER — DEXMETHYLPHENIDATE HYDROCHLORIDE 15 MG/1
15 CAPSULE, EXTENDED RELEASE ORAL DAILY
Qty: 30 CAPSULE | Refills: 0 | Status: SHIPPED | OUTPATIENT
Start: 2023-06-07 | End: 2023-06-21 | Stop reason: SDUPTHER

## 2023-04-12 RX ORDER — MIRTAZAPINE 45 MG/1
45 TABLET, FILM COATED ORAL NIGHTLY
Qty: 30 TABLET | Refills: 5 | Status: SHIPPED | OUTPATIENT
Start: 2023-04-12 | End: 2023-05-12

## 2023-04-12 RX ORDER — LAMOTRIGINE 200 MG/1
200 TABLET ORAL DAILY
Qty: 30 TABLET | Refills: 5 | Status: SHIPPED | OUTPATIENT
Start: 2023-04-12
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
## 2023-04-13 DIAGNOSIS — R79.89 LOW VITAMIN B12 LEVEL: Primary | ICD-10-CM

## 2023-04-13 LAB — VIT B12 SERPL-MCNC: 236 NG/L (ref 180–914)

## 2023-04-13 RX ORDER — CYANOCOBALAMIN 1000 UG/ML
1000 INJECTION, SOLUTION INTRAMUSCULAR; SUBCUTANEOUS
Qty: 1 ML | Refills: 6 | Status: SHIPPED | OUTPATIENT
Start: 2023-04-13

## 2023-04-13 RX ORDER — SYRINGE-NEEDLE,INSULIN,0.5 ML 27GX1/2"
1 SYRINGE, EMPTY DISPOSABLE MISCELLANEOUS
Qty: 10 EACH | Refills: 0 | Status: SHIPPED | OUTPATIENT
Start: 2023-04-13

## 2023-04-17 LAB — METHYLMALONATE SERPL-SCNC: 0.19 NMOL/ML

## 2023-05-17 ENCOUNTER — OFFICE VISIT (OUTPATIENT)
Dept: PSYCHIATRY | Facility: CLINIC | Age: 23
End: 2023-05-17
Payer: COMMERCIAL

## 2023-05-17 VITALS
BODY MASS INDEX: 21.2 KG/M2 | SYSTOLIC BLOOD PRESSURE: 105 MMHG | WEIGHT: 104.94 LBS | HEART RATE: 88 BPM | DIASTOLIC BLOOD PRESSURE: 56 MMHG

## 2023-05-17 DIAGNOSIS — F60.3 BORDERLINE PERSONALITY DISORDER: ICD-10-CM

## 2023-05-17 DIAGNOSIS — F41.1 GAD (GENERALIZED ANXIETY DISORDER): Primary | ICD-10-CM

## 2023-05-17 DIAGNOSIS — F90.0 ATTENTION DEFICIT HYPERACTIVITY DISORDER (ADHD), PREDOMINANTLY INATTENTIVE TYPE: ICD-10-CM

## 2023-05-17 PROCEDURE — 99214 OFFICE O/P EST MOD 30 MIN: CPT | Mod: S$GLB,,, | Performed by: STUDENT IN AN ORGANIZED HEALTH CARE EDUCATION/TRAINING PROGRAM

## 2023-05-17 PROCEDURE — 99214 PR OFFICE/OUTPT VISIT, EST, LEVL IV, 30-39 MIN: ICD-10-PCS | Mod: S$GLB,,, | Performed by: STUDENT IN AN ORGANIZED HEALTH CARE EDUCATION/TRAINING PROGRAM

## 2023-05-17 PROCEDURE — 3008F PR BODY MASS INDEX (BMI) DOCUMENTED: ICD-10-PCS | Mod: CPTII,S$GLB,, | Performed by: STUDENT IN AN ORGANIZED HEALTH CARE EDUCATION/TRAINING PROGRAM

## 2023-05-17 PROCEDURE — 3074F SYST BP LT 130 MM HG: CPT | Mod: CPTII,S$GLB,, | Performed by: STUDENT IN AN ORGANIZED HEALTH CARE EDUCATION/TRAINING PROGRAM

## 2023-05-17 PROCEDURE — 3008F BODY MASS INDEX DOCD: CPT | Mod: CPTII,S$GLB,, | Performed by: STUDENT IN AN ORGANIZED HEALTH CARE EDUCATION/TRAINING PROGRAM

## 2023-05-17 PROCEDURE — 3078F DIAST BP <80 MM HG: CPT | Mod: CPTII,S$GLB,, | Performed by: STUDENT IN AN ORGANIZED HEALTH CARE EDUCATION/TRAINING PROGRAM

## 2023-05-17 PROCEDURE — 99999 PR PBB SHADOW E&M-EST. PATIENT-LVL II: ICD-10-PCS | Mod: PBBFAC,,, | Performed by: STUDENT IN AN ORGANIZED HEALTH CARE EDUCATION/TRAINING PROGRAM

## 2023-05-17 PROCEDURE — 3078F PR MOST RECENT DIASTOLIC BLOOD PRESSURE < 80 MM HG: ICD-10-PCS | Mod: CPTII,S$GLB,, | Performed by: STUDENT IN AN ORGANIZED HEALTH CARE EDUCATION/TRAINING PROGRAM

## 2023-05-17 PROCEDURE — 99999 PR PBB SHADOW E&M-EST. PATIENT-LVL II: CPT | Mod: PBBFAC,,, | Performed by: STUDENT IN AN ORGANIZED HEALTH CARE EDUCATION/TRAINING PROGRAM

## 2023-05-17 PROCEDURE — 3074F PR MOST RECENT SYSTOLIC BLOOD PRESSURE < 130 MM HG: ICD-10-PCS | Mod: CPTII,S$GLB,, | Performed by: STUDENT IN AN ORGANIZED HEALTH CARE EDUCATION/TRAINING PROGRAM
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
## 2023-06-21 ENCOUNTER — OFFICE VISIT (OUTPATIENT)
Dept: PSYCHIATRY | Facility: CLINIC | Age: 23
End: 2023-06-21
Payer: COMMERCIAL

## 2023-06-21 VITALS
SYSTOLIC BLOOD PRESSURE: 119 MMHG | HEART RATE: 107 BPM | BODY MASS INDEX: 21.46 KG/M2 | DIASTOLIC BLOOD PRESSURE: 68 MMHG | WEIGHT: 106.25 LBS

## 2023-06-21 DIAGNOSIS — F90.0 ATTENTION DEFICIT HYPERACTIVITY DISORDER (ADHD), PREDOMINANTLY INATTENTIVE TYPE: Primary | ICD-10-CM

## 2023-06-21 DIAGNOSIS — F32.9 MAJOR DEPRESSION, CHRONIC: ICD-10-CM

## 2023-06-21 DIAGNOSIS — F41.1 GAD (GENERALIZED ANXIETY DISORDER): ICD-10-CM

## 2023-06-21 PROCEDURE — 3008F BODY MASS INDEX DOCD: CPT | Mod: CPTII,S$GLB,, | Performed by: STUDENT IN AN ORGANIZED HEALTH CARE EDUCATION/TRAINING PROGRAM

## 2023-06-21 PROCEDURE — 3078F PR MOST RECENT DIASTOLIC BLOOD PRESSURE < 80 MM HG: ICD-10-PCS | Mod: CPTII,S$GLB,, | Performed by: STUDENT IN AN ORGANIZED HEALTH CARE EDUCATION/TRAINING PROGRAM

## 2023-06-21 PROCEDURE — 3008F PR BODY MASS INDEX (BMI) DOCUMENTED: ICD-10-PCS | Mod: CPTII,S$GLB,, | Performed by: STUDENT IN AN ORGANIZED HEALTH CARE EDUCATION/TRAINING PROGRAM

## 2023-06-21 PROCEDURE — 99213 OFFICE O/P EST LOW 20 MIN: CPT | Mod: S$GLB,,, | Performed by: STUDENT IN AN ORGANIZED HEALTH CARE EDUCATION/TRAINING PROGRAM

## 2023-06-21 PROCEDURE — 3078F DIAST BP <80 MM HG: CPT | Mod: CPTII,S$GLB,, | Performed by: STUDENT IN AN ORGANIZED HEALTH CARE EDUCATION/TRAINING PROGRAM

## 2023-06-21 PROCEDURE — 3074F PR MOST RECENT SYSTOLIC BLOOD PRESSURE < 130 MM HG: ICD-10-PCS | Mod: CPTII,S$GLB,, | Performed by: STUDENT IN AN ORGANIZED HEALTH CARE EDUCATION/TRAINING PROGRAM

## 2023-06-21 PROCEDURE — 99213 PR OFFICE/OUTPT VISIT, EST, LEVL III, 20-29 MIN: ICD-10-PCS | Mod: S$GLB,,, | Performed by: STUDENT IN AN ORGANIZED HEALTH CARE EDUCATION/TRAINING PROGRAM

## 2023-06-21 PROCEDURE — 3074F SYST BP LT 130 MM HG: CPT | Mod: CPTII,S$GLB,, | Performed by: STUDENT IN AN ORGANIZED HEALTH CARE EDUCATION/TRAINING PROGRAM

## 2023-06-21 RX ORDER — DEXMETHYLPHENIDATE HYDROCHLORIDE 15 MG/1
15 CAPSULE, EXTENDED RELEASE ORAL DAILY
Qty: 30 CAPSULE | Refills: 0 | Status: SHIPPED | OUTPATIENT
Start: 2023-09-13 | End: 2023-10-13

## 2023-06-21 RX ORDER — DEXMETHYLPHENIDATE HYDROCHLORIDE 15 MG/1
15 CAPSULE, EXTENDED RELEASE ORAL DAILY
Qty: 30 CAPSULE | Refills: 0 | Status: SHIPPED | OUTPATIENT
Start: 2023-08-16 | End: 2023-09-15

## 2023-06-21 RX ORDER — DEXMETHYLPHENIDATE HYDROCHLORIDE 15 MG/1
15 CAPSULE, EXTENDED RELEASE ORAL DAILY
Qty: 30 CAPSULE | Refills: 0 | Status: SHIPPED | OUTPATIENT
Start: 2023-07-19 | End: 2023-08-18

## 2025-05-23 ENCOUNTER — PATIENT OUTREACH (OUTPATIENT)
Dept: ADMINISTRATIVE | Facility: HOSPITAL | Age: 25
End: 2025-05-23
Payer: COMMERCIAL

## 2025-05-23 NOTE — PROGRESS NOTES
Given that you have been exposed to chlamydia, will start treatment. Take all 7 days. Take all medication. You should be retested after antibiotics to ensure no residual infection.   You should refrain from sexual activity until you have been treated and retested if your initial test is positive  It is important that you contact any sexual partners if you do have a positive test as they will need to be tested and treated as well  Discussed with your doctor for further testing such as HIV  Return to ER if any new or worsening symptoms or new concerns Panel review. LOV 2022. Pt established care with a new PCP in [place]. Care team updated